# Patient Record
Sex: MALE | Race: WHITE | NOT HISPANIC OR LATINO | Employment: OTHER | URBAN - METROPOLITAN AREA
[De-identification: names, ages, dates, MRNs, and addresses within clinical notes are randomized per-mention and may not be internally consistent; named-entity substitution may affect disease eponyms.]

---

## 2019-11-26 ENCOUNTER — APPOINTMENT (OUTPATIENT)
Dept: GENERAL RADIOLOGY | Facility: HOSPITAL | Age: 79
End: 2019-11-26

## 2019-11-26 ENCOUNTER — HOSPITAL ENCOUNTER (OUTPATIENT)
Facility: HOSPITAL | Age: 79
Setting detail: OBSERVATION
Discharge: HOME | End: 2019-11-27
Attending: EMERGENCY MEDICINE | Admitting: INTERNAL MEDICINE

## 2019-11-26 DIAGNOSIS — Z86.79 HISTORY OF CORONARY ARTERY DISEASE: ICD-10-CM

## 2019-11-26 DIAGNOSIS — R07.9 CHEST PAIN, UNSPECIFIED TYPE: Primary | ICD-10-CM

## 2019-11-26 DIAGNOSIS — N28.9 RENAL INSUFFICIENCY: ICD-10-CM

## 2019-11-26 DIAGNOSIS — Z95.0 HISTORY OF PACEMAKER: ICD-10-CM

## 2019-11-26 DIAGNOSIS — Z86.79 HISTORY OF ATRIAL FIBRILLATION: ICD-10-CM

## 2019-11-26 LAB
ALBUMIN SERPL-MCNC: 4.3 G/DL (ref 3.5–5.2)
ALBUMIN/GLOB SERPL: 1.7 G/DL
ALP SERPL-CCNC: 72 U/L (ref 39–117)
ALT SERPL W P-5'-P-CCNC: 13 U/L (ref 1–41)
ANION GAP SERPL CALCULATED.3IONS-SCNC: 11 MMOL/L (ref 5–15)
AST SERPL-CCNC: 15 U/L (ref 1–40)
BASOPHILS # BLD AUTO: 0.07 10*3/MM3 (ref 0–0.2)
BASOPHILS NFR BLD AUTO: 0.8 % (ref 0–1.5)
BILIRUB SERPL-MCNC: 0.9 MG/DL (ref 0.2–1.2)
BUN BLD-MCNC: 14 MG/DL (ref 8–23)
BUN/CREAT SERPL: 8.7 (ref 7–25)
CALCIUM SPEC-SCNC: 10.6 MG/DL (ref 8.6–10.5)
CHLORIDE SERPL-SCNC: 105 MMOL/L (ref 98–107)
CO2 SERPL-SCNC: 26 MMOL/L (ref 22–29)
CREAT BLD-MCNC: 1.61 MG/DL (ref 0.76–1.27)
DEPRECATED RDW RBC AUTO: 43.7 FL (ref 37–54)
EOSINOPHIL # BLD AUTO: 0.47 10*3/MM3 (ref 0–0.4)
EOSINOPHIL NFR BLD AUTO: 5.6 % (ref 0.3–6.2)
ERYTHROCYTE [DISTWIDTH] IN BLOOD BY AUTOMATED COUNT: 13.3 % (ref 12.3–15.4)
GFR SERPL CREATININE-BSD FRML MDRD: 42 ML/MIN/1.73
GLOBULIN UR ELPH-MCNC: 2.6 GM/DL
GLUCOSE BLD-MCNC: 117 MG/DL (ref 65–99)
HCT VFR BLD AUTO: 41.8 % (ref 37.5–51)
HGB BLD-MCNC: 13.9 G/DL (ref 13–17.7)
HOLD SPECIMEN: NORMAL
HOLD SPECIMEN: NORMAL
IMM GRANULOCYTES # BLD AUTO: 0.02 10*3/MM3 (ref 0–0.05)
IMM GRANULOCYTES NFR BLD AUTO: 0.2 % (ref 0–0.5)
LIPASE SERPL-CCNC: 39 U/L (ref 13–60)
LYMPHOCYTES # BLD AUTO: 1.98 10*3/MM3 (ref 0.7–3.1)
LYMPHOCYTES NFR BLD AUTO: 23.7 % (ref 19.6–45.3)
MCH RBC QN AUTO: 30 PG (ref 26.6–33)
MCHC RBC AUTO-ENTMCNC: 33.3 G/DL (ref 31.5–35.7)
MCV RBC AUTO: 90.3 FL (ref 79–97)
MONOCYTES # BLD AUTO: 0.59 10*3/MM3 (ref 0.1–0.9)
MONOCYTES NFR BLD AUTO: 7 % (ref 5–12)
NEUTROPHILS # BLD AUTO: 5.24 10*3/MM3 (ref 1.7–7)
NEUTROPHILS NFR BLD AUTO: 62.7 % (ref 42.7–76)
NRBC BLD AUTO-RTO: 0 /100 WBC (ref 0–0.2)
NT-PROBNP SERPL-MCNC: 849.9 PG/ML (ref 5–1800)
PLATELET # BLD AUTO: 211 10*3/MM3 (ref 140–450)
PMV BLD AUTO: 10.8 FL (ref 6–12)
POTASSIUM BLD-SCNC: 4.4 MMOL/L (ref 3.5–5.2)
PROT SERPL-MCNC: 6.9 G/DL (ref 6–8.5)
RBC # BLD AUTO: 4.63 10*6/MM3 (ref 4.14–5.8)
SODIUM BLD-SCNC: 142 MMOL/L (ref 136–145)
TROPONIN T SERPL-MCNC: <0.01 NG/ML (ref 0–0.03)
WBC NRBC COR # BLD: 8.37 10*3/MM3 (ref 3.4–10.8)
WHOLE BLOOD HOLD SPECIMEN: NORMAL
WHOLE BLOOD HOLD SPECIMEN: NORMAL

## 2019-11-26 PROCEDURE — 80053 COMPREHEN METABOLIC PANEL: CPT

## 2019-11-26 PROCEDURE — G0378 HOSPITAL OBSERVATION PER HR: HCPCS

## 2019-11-26 PROCEDURE — 83880 ASSAY OF NATRIURETIC PEPTIDE: CPT

## 2019-11-26 PROCEDURE — 93005 ELECTROCARDIOGRAM TRACING: CPT

## 2019-11-26 PROCEDURE — 25010000002 HEPARIN (PORCINE) PER 1000 UNITS: Performed by: NURSE PRACTITIONER

## 2019-11-26 PROCEDURE — 93005 ELECTROCARDIOGRAM TRACING: CPT | Performed by: EMERGENCY MEDICINE

## 2019-11-26 PROCEDURE — 99219 PR INITIAL OBSERVATION CARE/DAY 50 MINUTES: CPT | Performed by: INTERNAL MEDICINE

## 2019-11-26 PROCEDURE — 71045 X-RAY EXAM CHEST 1 VIEW: CPT

## 2019-11-26 PROCEDURE — 83690 ASSAY OF LIPASE: CPT

## 2019-11-26 PROCEDURE — 84484 ASSAY OF TROPONIN QUANT: CPT

## 2019-11-26 PROCEDURE — 96372 THER/PROPH/DIAG INJ SC/IM: CPT

## 2019-11-26 PROCEDURE — 85025 COMPLETE CBC W/AUTO DIFF WBC: CPT | Performed by: EMERGENCY MEDICINE

## 2019-11-26 PROCEDURE — 99284 EMERGENCY DEPT VISIT MOD MDM: CPT

## 2019-11-26 RX ORDER — ATORVASTATIN CALCIUM 10 MG/1
10 TABLET, FILM COATED ORAL DAILY
Status: DISCONTINUED | OUTPATIENT
Start: 2019-11-26 | End: 2019-11-27 | Stop reason: HOSPADM

## 2019-11-26 RX ORDER — ASPIRIN 81 MG/1
324 TABLET, CHEWABLE ORAL ONCE
Status: COMPLETED | OUTPATIENT
Start: 2019-11-26 | End: 2019-11-26

## 2019-11-26 RX ORDER — ASPIRIN 81 MG/1
81 TABLET, CHEWABLE ORAL DAILY
Status: DISCONTINUED | OUTPATIENT
Start: 2019-11-27 | End: 2019-11-27 | Stop reason: HOSPADM

## 2019-11-26 RX ORDER — SIMVASTATIN 20 MG
20 TABLET ORAL NIGHTLY
COMMUNITY

## 2019-11-26 RX ORDER — SODIUM CHLORIDE 0.9 % (FLUSH) 0.9 %
10 SYRINGE (ML) INJECTION AS NEEDED
Status: DISCONTINUED | OUTPATIENT
Start: 2019-11-26 | End: 2019-11-27 | Stop reason: HOSPADM

## 2019-11-26 RX ORDER — ASPIRIN 81 MG/1
81 TABLET ORAL DAILY
COMMUNITY

## 2019-11-26 RX ORDER — ACETAMINOPHEN 650 MG/1
650 SUPPOSITORY RECTAL EVERY 4 HOURS PRN
Status: DISCONTINUED | OUTPATIENT
Start: 2019-11-26 | End: 2019-11-27 | Stop reason: HOSPADM

## 2019-11-26 RX ORDER — SODIUM CHLORIDE 0.9 % (FLUSH) 0.9 %
10 SYRINGE (ML) INJECTION EVERY 12 HOURS SCHEDULED
Status: DISCONTINUED | OUTPATIENT
Start: 2019-11-26 | End: 2019-11-27 | Stop reason: HOSPADM

## 2019-11-26 RX ORDER — SOTALOL HYDROCHLORIDE 120 MG/1
120 TABLET ORAL 2 TIMES DAILY
Status: ON HOLD | COMMUNITY
End: 2019-11-27 | Stop reason: SDUPTHER

## 2019-11-26 RX ORDER — AMLODIPINE BESYLATE 5 MG/1
5 TABLET ORAL DAILY
Status: ON HOLD | COMMUNITY
End: 2019-11-27 | Stop reason: SDUPTHER

## 2019-11-26 RX ORDER — ACETAMINOPHEN 160 MG/5ML
650 SOLUTION ORAL EVERY 4 HOURS PRN
Status: DISCONTINUED | OUTPATIENT
Start: 2019-11-26 | End: 2019-11-27 | Stop reason: HOSPADM

## 2019-11-26 RX ORDER — CLOPIDOGREL BISULFATE 75 MG/1
75 TABLET ORAL DAILY
COMMUNITY
End: 2020-03-09 | Stop reason: SDUPTHER

## 2019-11-26 RX ORDER — DOCUSATE SODIUM 100 MG/1
100 CAPSULE, LIQUID FILLED ORAL 2 TIMES DAILY
Status: DISCONTINUED | OUTPATIENT
Start: 2019-11-26 | End: 2019-11-27 | Stop reason: HOSPADM

## 2019-11-26 RX ORDER — CLOPIDOGREL BISULFATE 75 MG/1
75 TABLET ORAL DAILY
Status: DISCONTINUED | OUTPATIENT
Start: 2019-11-26 | End: 2019-11-27 | Stop reason: HOSPADM

## 2019-11-26 RX ORDER — HEPARIN SODIUM 5000 [USP'U]/ML
5000 INJECTION, SOLUTION INTRAVENOUS; SUBCUTANEOUS EVERY 12 HOURS SCHEDULED
Status: DISCONTINUED | OUTPATIENT
Start: 2019-11-26 | End: 2019-11-27 | Stop reason: HOSPADM

## 2019-11-26 RX ORDER — AMLODIPINE BESYLATE 5 MG/1
5 TABLET ORAL DAILY
Status: DISCONTINUED | OUTPATIENT
Start: 2019-11-26 | End: 2019-11-27 | Stop reason: HOSPADM

## 2019-11-26 RX ORDER — SOTALOL HYDROCHLORIDE 80 MG/1
80 TABLET ORAL
Status: DISCONTINUED | OUTPATIENT
Start: 2019-11-26 | End: 2019-11-27 | Stop reason: HOSPADM

## 2019-11-26 RX ORDER — SODIUM CHLORIDE 9 MG/ML
75 INJECTION, SOLUTION INTRAVENOUS CONTINUOUS
Status: DISCONTINUED | OUTPATIENT
Start: 2019-11-26 | End: 2019-11-27 | Stop reason: HOSPADM

## 2019-11-26 RX ORDER — SOTALOL HYDROCHLORIDE 80 MG/1
120 TABLET ORAL
Status: DISCONTINUED | OUTPATIENT
Start: 2019-11-27 | End: 2019-11-26 | Stop reason: SDUPTHER

## 2019-11-26 RX ORDER — ACETAMINOPHEN 325 MG/1
650 TABLET ORAL EVERY 4 HOURS PRN
Status: DISCONTINUED | OUTPATIENT
Start: 2019-11-26 | End: 2019-11-27 | Stop reason: HOSPADM

## 2019-11-26 RX ADMIN — SODIUM CHLORIDE, POTASSIUM CHLORIDE, SODIUM LACTATE AND CALCIUM CHLORIDE 500 ML: 600; 310; 30; 20 INJECTION, SOLUTION INTRAVENOUS at 15:35

## 2019-11-26 RX ADMIN — DOCUSATE SODIUM 100 MG: 100 CAPSULE, LIQUID FILLED ORAL at 22:14

## 2019-11-26 RX ADMIN — HEPARIN SODIUM 5000 UNITS: 5000 INJECTION, SOLUTION INTRAVENOUS; SUBCUTANEOUS at 22:14

## 2019-11-26 RX ADMIN — SODIUM CHLORIDE, PRESERVATIVE FREE 10 ML: 5 INJECTION INTRAVENOUS at 22:15

## 2019-11-26 RX ADMIN — SODIUM CHLORIDE 75 ML/HR: 9 INJECTION, SOLUTION INTRAVENOUS at 22:26

## 2019-11-26 RX ADMIN — ATORVASTATIN CALCIUM 10 MG: 10 TABLET, FILM COATED ORAL at 22:14

## 2019-11-26 RX ADMIN — ASPIRIN 81 MG 324 MG: 81 TABLET ORAL at 15:24

## 2019-11-27 ENCOUNTER — APPOINTMENT (OUTPATIENT)
Dept: CARDIOLOGY | Facility: HOSPITAL | Age: 79
End: 2019-11-27

## 2019-11-27 VITALS
BODY MASS INDEX: 30.27 KG/M2 | OXYGEN SATURATION: 93 % | SYSTOLIC BLOOD PRESSURE: 149 MMHG | TEMPERATURE: 97.4 F | RESPIRATION RATE: 16 BRPM | DIASTOLIC BLOOD PRESSURE: 92 MMHG | WEIGHT: 235.89 LBS | HEART RATE: 62 BPM | HEIGHT: 74 IN

## 2019-11-27 PROBLEM — I10 ESSENTIAL HYPERTENSION: Status: ACTIVE | Noted: 2019-11-27

## 2019-11-27 PROBLEM — I49.5 SICK SINUS SYNDROME (HCC): Status: ACTIVE | Noted: 2019-11-27

## 2019-11-27 PROBLEM — I48.0 PAROXYSMAL ATRIAL FIBRILLATION (HCC): Status: ACTIVE | Noted: 2019-11-27

## 2019-11-27 PROBLEM — I25.118 CORONARY ARTERY DISEASE OF NATIVE ARTERY OF NATIVE HEART WITH STABLE ANGINA PECTORIS (HCC): Status: ACTIVE | Noted: 2019-11-27

## 2019-11-27 PROBLEM — R07.9 CHEST PAIN: Status: ACTIVE | Noted: 2019-11-27

## 2019-11-27 PROBLEM — E78.2 MIXED HYPERLIPIDEMIA: Status: ACTIVE | Noted: 2019-11-27

## 2019-11-27 LAB
ANION GAP SERPL CALCULATED.3IONS-SCNC: 13 MMOL/L (ref 5–15)
BH CV ECHO MEAS - AO MAX PG (FULL): 2.1 MMHG
BH CV ECHO MEAS - AO MAX PG: 7.5 MMHG
BH CV ECHO MEAS - AO MEAN PG (FULL): 1 MMHG
BH CV ECHO MEAS - AO MEAN PG: 3.9 MMHG
BH CV ECHO MEAS - AO ROOT AREA (BSA CORRECTED): 1.5
BH CV ECHO MEAS - AO ROOT AREA: 9.5 CM^2
BH CV ECHO MEAS - AO ROOT DIAM: 3.5 CM
BH CV ECHO MEAS - AO V2 MAX: 137 CM/SEC
BH CV ECHO MEAS - AO V2 MEAN: 92.7 CM/SEC
BH CV ECHO MEAS - AO V2 VTI: 34.9 CM
BH CV ECHO MEAS - AVA(I,A): 4 CM^2
BH CV ECHO MEAS - AVA(I,D): 4 CM^2
BH CV ECHO MEAS - AVA(V,A): 4.3 CM^2
BH CV ECHO MEAS - AVA(V,D): 4.3 CM^2
BH CV ECHO MEAS - BSA(HAYCOCK): 2.4 M^2
BH CV ECHO MEAS - BSA: 2.3 M^2
BH CV ECHO MEAS - BZI_BMI: 30.2 KILOGRAMS/M^2
BH CV ECHO MEAS - BZI_METRIC_HEIGHT: 188 CM
BH CV ECHO MEAS - BZI_METRIC_WEIGHT: 106.6 KG
BH CV ECHO MEAS - EDV(CUBED): 100.2 ML
BH CV ECHO MEAS - EDV(MOD-SP2): 92 ML
BH CV ECHO MEAS - EDV(MOD-SP4): 90 ML
BH CV ECHO MEAS - EDV(TEICH): 99.6 ML
BH CV ECHO MEAS - EF(CUBED): 70.6 %
BH CV ECHO MEAS - EF(MOD-BP): 69 %
BH CV ECHO MEAS - EF(MOD-SP2): 57.6 %
BH CV ECHO MEAS - EF(MOD-SP4): 76.7 %
BH CV ECHO MEAS - EF(TEICH): 62.2 %
BH CV ECHO MEAS - ESV(CUBED): 29.5 ML
BH CV ECHO MEAS - ESV(MOD-SP2): 39 ML
BH CV ECHO MEAS - ESV(MOD-SP4): 21 ML
BH CV ECHO MEAS - ESV(TEICH): 37.6 ML
BH CV ECHO MEAS - FS: 33.5 %
BH CV ECHO MEAS - IVS/LVPW: 0.85
BH CV ECHO MEAS - IVSD: 0.79 CM
BH CV ECHO MEAS - LA DIMENSION: 3.6 CM
BH CV ECHO MEAS - LA/AO: 1
BH CV ECHO MEAS - LAD MAJOR: 4.9 CM
BH CV ECHO MEAS - LAT PEAK E' VEL: 7.9 CM/SEC
BH CV ECHO MEAS - LATERAL E/E' RATIO: 8.5
BH CV ECHO MEAS - LV DIASTOLIC VOL/BSA (35-75): 38.7 ML/M^2
BH CV ECHO MEAS - LV MASS(C)D: 131.6 GRAMS
BH CV ECHO MEAS - LV MASS(C)DI: 56.6 GRAMS/M^2
BH CV ECHO MEAS - LV MAX PG: 5.4 MMHG
BH CV ECHO MEAS - LV MEAN PG: 2.9 MMHG
BH CV ECHO MEAS - LV SYSTOLIC VOL/BSA (12-30): 9 ML/M^2
BH CV ECHO MEAS - LV V1 MAX: 116.3 CM/SEC
BH CV ECHO MEAS - LV V1 MEAN: 78.7 CM/SEC
BH CV ECHO MEAS - LV V1 VTI: 27.4 CM
BH CV ECHO MEAS - LVIDD: 4.6 CM
BH CV ECHO MEAS - LVIDS: 3.1 CM
BH CV ECHO MEAS - LVLD AP2: 7.6 CM
BH CV ECHO MEAS - LVLD AP4: 7.4 CM
BH CV ECHO MEAS - LVLS AP2: 6.5 CM
BH CV ECHO MEAS - LVLS AP4: 6.3 CM
BH CV ECHO MEAS - LVOT AREA (M): 5.3 CM^2
BH CV ECHO MEAS - LVOT AREA: 5.1 CM^2
BH CV ECHO MEAS - LVOT DIAM: 2.5 CM
BH CV ECHO MEAS - LVPWD: 0.93 CM
BH CV ECHO MEAS - MED PEAK E' VEL: 5.9 CM/SEC
BH CV ECHO MEAS - MEDIAL E/E' RATIO: 11.2
BH CV ECHO MEAS - MV A MAX VEL: 89.9 CM/SEC
BH CV ECHO MEAS - MV DEC TIME: 0.26 SEC
BH CV ECHO MEAS - MV E MAX VEL: 68 CM/SEC
BH CV ECHO MEAS - MV E/A: 0.76
BH CV ECHO MEAS - PA ACC SLOPE: 331.4 CM/SEC^2
BH CV ECHO MEAS - PA ACC TIME: 0.18 SEC
BH CV ECHO MEAS - PA MAX PG: 2.1 MMHG
BH CV ECHO MEAS - PA PR(ACCEL): -3.4 MMHG
BH CV ECHO MEAS - PA V2 MAX: 72.4 CM/SEC
BH CV ECHO MEAS - SI(AO): 142.3 ML/M^2
BH CV ECHO MEAS - SI(CUBED): 30.4 ML/M^2
BH CV ECHO MEAS - SI(LVOT): 60.2 ML/M^2
BH CV ECHO MEAS - SI(MOD-SP2): 22.8 ML/M^2
BH CV ECHO MEAS - SI(MOD-SP4): 29.6 ML/M^2
BH CV ECHO MEAS - SI(TEICH): 26.6 ML/M^2
BH CV ECHO MEAS - SV(AO): 331.2 ML
BH CV ECHO MEAS - SV(CUBED): 70.7 ML
BH CV ECHO MEAS - SV(LVOT): 140.1 ML
BH CV ECHO MEAS - SV(MOD-SP2): 53 ML
BH CV ECHO MEAS - SV(MOD-SP4): 69 ML
BH CV ECHO MEAS - SV(TEICH): 62 ML
BH CV ECHO MEAS - TAPSE (>1.6): 2.8 CM2
BH CV ECHO MEASUREMENTS AVERAGE E/E' RATIO: 9.86
BH CV STRESS BP STAGE 1: NORMAL
BH CV STRESS BP STAGE 4: NORMAL
BH CV STRESS COMMENTS STAGE 1: NORMAL
BH CV STRESS DOSE REGADENOSON STAGE 1: 0.4
BH CV STRESS DURATION MIN STAGE 1: 1
BH CV STRESS DURATION MIN STAGE 2: 1
BH CV STRESS DURATION MIN STAGE 3: 1
BH CV STRESS DURATION MIN STAGE 4: 1
BH CV STRESS DURATION SEC STAGE 1: 0
BH CV STRESS DURATION SEC STAGE 2: 0
BH CV STRESS DURATION SEC STAGE 3: 0
BH CV STRESS DURATION SEC STAGE 4: 0
BH CV STRESS HR STAGE 1: 60
BH CV STRESS HR STAGE 2: 73
BH CV STRESS HR STAGE 3: 68
BH CV STRESS HR STAGE 4: 69
BH CV STRESS O2 STAGE 1: 97
BH CV STRESS O2 STAGE 2: 97
BH CV STRESS O2 STAGE 3: 97
BH CV STRESS O2 STAGE 4: 96
BH CV STRESS PROTOCOL 1: NORMAL
BH CV STRESS RECOVERY BP: NORMAL MMHG
BH CV STRESS RECOVERY HR: 67 BPM
BH CV STRESS RECOVERY O2: 96 %
BH CV STRESS STAGE 1: 1
BH CV STRESS STAGE 2: 2
BH CV STRESS STAGE 3: 3
BH CV STRESS STAGE 4: 4
BH CV VAS BP RIGHT ARM: NORMAL MMHG
BH CV XLRA - RV BASE: 3.7 CM
BH CV XLRA - RV LENGTH: 8 CM
BH CV XLRA - RV MID: 2.8 CM
BH CV XLRA - TDI S': 12.5 CM/SEC
BUN BLD-MCNC: 14 MG/DL (ref 8–23)
BUN/CREAT SERPL: 9.7 (ref 7–25)
CALCIUM SPEC-SCNC: 10.4 MG/DL (ref 8.6–10.5)
CHLORIDE SERPL-SCNC: 103 MMOL/L (ref 98–107)
CHOLEST SERPL-MCNC: 139 MG/DL (ref 0–200)
CO2 SERPL-SCNC: 25 MMOL/L (ref 22–29)
CREAT BLD-MCNC: 1.44 MG/DL (ref 0.76–1.27)
DEPRECATED RDW RBC AUTO: 43.2 FL (ref 37–54)
ERYTHROCYTE [DISTWIDTH] IN BLOOD BY AUTOMATED COUNT: 13.2 % (ref 12.3–15.4)
GFR SERPL CREATININE-BSD FRML MDRD: 47 ML/MIN/1.73
GLUCOSE BLD-MCNC: 111 MG/DL (ref 65–99)
HBA1C MFR BLD: 6.5 % (ref 4.8–5.6)
HCT VFR BLD AUTO: 40.5 % (ref 37.5–51)
HDLC SERPL-MCNC: 40 MG/DL (ref 40–60)
HGB BLD-MCNC: 13.7 G/DL (ref 13–17.7)
LDLC SERPL CALC-MCNC: 77 MG/DL (ref 0–100)
LDLC/HDLC SERPL: 1.92 {RATIO}
LEFT ATRIUM VOLUME INDEX: 21.9 ML/M^2
LEFT ATRIUM VOLUME: 51 ML
LV EF 2D ECHO EST: 60 %
LV EF NUC BP: 62 %
MAXIMAL PREDICTED HEART RATE: 141 BPM
MAXIMAL PREDICTED HEART RATE: 141 BPM
MCH RBC QN AUTO: 30.3 PG (ref 26.6–33)
MCHC RBC AUTO-ENTMCNC: 33.8 G/DL (ref 31.5–35.7)
MCV RBC AUTO: 89.6 FL (ref 79–97)
PERCENT MAX PREDICTED HR: 51.77 %
PLATELET # BLD AUTO: 189 10*3/MM3 (ref 140–450)
PMV BLD AUTO: 10.7 FL (ref 6–12)
POTASSIUM BLD-SCNC: 4.1 MMOL/L (ref 3.5–5.2)
RBC # BLD AUTO: 4.52 10*6/MM3 (ref 4.14–5.8)
SODIUM BLD-SCNC: 141 MMOL/L (ref 136–145)
STRESS BASELINE BP: NORMAL MMHG
STRESS BASELINE HR: 60 BPM
STRESS O2 SAT REST: 95 %
STRESS PERCENT HR: 61 %
STRESS POST ESTIMATED WORKLOAD: 1 METS
STRESS POST EXERCISE DUR MIN: 4 MIN
STRESS POST EXERCISE DUR SEC: 0 SEC
STRESS POST O2 SAT PEAK: 97 %
STRESS POST PEAK BP: NORMAL MMHG
STRESS POST PEAK HR: 73 BPM
STRESS TARGET HR: 120 BPM
STRESS TARGET HR: 120 BPM
TRIGL SERPL-MCNC: 112 MG/DL (ref 0–150)
TROPONIN T SERPL-MCNC: <0.01 NG/ML (ref 0–0.03)
VLDLC SERPL-MCNC: 22.4 MG/DL
WBC NRBC COR # BLD: 8.12 10*3/MM3 (ref 3.4–10.8)

## 2019-11-27 PROCEDURE — 25010000002 HEPARIN (PORCINE) PER 1000 UNITS: Performed by: NURSE PRACTITIONER

## 2019-11-27 PROCEDURE — 80048 BASIC METABOLIC PNL TOTAL CA: CPT | Performed by: NURSE PRACTITIONER

## 2019-11-27 PROCEDURE — 83036 HEMOGLOBIN GLYCOSYLATED A1C: CPT | Performed by: NURSE PRACTITIONER

## 2019-11-27 PROCEDURE — 0 RUBIDIUM CHLORIDE: Performed by: NURSE PRACTITIONER

## 2019-11-27 PROCEDURE — 78492 MYOCRD IMG PET MLT RST&STRS: CPT | Performed by: INTERNAL MEDICINE

## 2019-11-27 PROCEDURE — 96372 THER/PROPH/DIAG INJ SC/IM: CPT

## 2019-11-27 PROCEDURE — 93018 CV STRESS TEST I&R ONLY: CPT | Performed by: INTERNAL MEDICINE

## 2019-11-27 PROCEDURE — G0378 HOSPITAL OBSERVATION PER HR: HCPCS

## 2019-11-27 PROCEDURE — 25010000002 LORAZEPAM PER 2 MG: Performed by: INTERNAL MEDICINE

## 2019-11-27 PROCEDURE — 96376 TX/PRO/DX INJ SAME DRUG ADON: CPT

## 2019-11-27 PROCEDURE — 99217 PR OBSERVATION CARE DISCHARGE MANAGEMENT: CPT | Performed by: INTERNAL MEDICINE

## 2019-11-27 PROCEDURE — 25010000002 REGADENOSON 0.4 MG/5ML SOLUTION: Performed by: NURSE PRACTITIONER

## 2019-11-27 PROCEDURE — 85027 COMPLETE CBC AUTOMATED: CPT | Performed by: NURSE PRACTITIONER

## 2019-11-27 PROCEDURE — 93306 TTE W/DOPPLER COMPLETE: CPT

## 2019-11-27 PROCEDURE — 93010 ELECTROCARDIOGRAM REPORT: CPT | Performed by: INTERNAL MEDICINE

## 2019-11-27 PROCEDURE — 93306 TTE W/DOPPLER COMPLETE: CPT | Performed by: INTERNAL MEDICINE

## 2019-11-27 PROCEDURE — 80061 LIPID PANEL: CPT | Performed by: NURSE PRACTITIONER

## 2019-11-27 PROCEDURE — 96374 THER/PROPH/DIAG INJ IV PUSH: CPT

## 2019-11-27 PROCEDURE — 84484 ASSAY OF TROPONIN QUANT: CPT | Performed by: NURSE PRACTITIONER

## 2019-11-27 PROCEDURE — 93017 CV STRESS TEST TRACING ONLY: CPT

## 2019-11-27 PROCEDURE — 78492 MYOCRD IMG PET MLT RST&STRS: CPT

## 2019-11-27 PROCEDURE — 93005 ELECTROCARDIOGRAM TRACING: CPT | Performed by: NURSE PRACTITIONER

## 2019-11-27 PROCEDURE — A9555 RB82 RUBIDIUM: HCPCS | Performed by: NURSE PRACTITIONER

## 2019-11-27 PROCEDURE — 93280 PM DEVICE PROGR EVAL DUAL: CPT | Performed by: INTERNAL MEDICINE

## 2019-11-27 RX ORDER — LORAZEPAM 2 MG/ML
0.5 INJECTION INTRAMUSCULAR ONCE
Status: COMPLETED | OUTPATIENT
Start: 2019-11-27 | End: 2019-11-27

## 2019-11-27 RX ORDER — ISOSORBIDE MONONITRATE 30 MG/1
30 TABLET, EXTENDED RELEASE ORAL EVERY MORNING
Qty: 30 TABLET | Refills: 2 | Status: SHIPPED | OUTPATIENT
Start: 2019-11-27 | End: 2019-11-29 | Stop reason: SDUPTHER

## 2019-11-27 RX ORDER — SOTALOL HYDROCHLORIDE 120 MG/1
120 TABLET ORAL DAILY
Qty: 30 TABLET | Refills: 2 | Status: SHIPPED | OUTPATIENT
Start: 2019-11-27 | End: 2019-11-29 | Stop reason: SDUPTHER

## 2019-11-27 RX ORDER — AMLODIPINE BESYLATE 10 MG/1
10 TABLET ORAL DAILY
Qty: 30 TABLET | Refills: 2 | Status: SHIPPED | OUTPATIENT
Start: 2019-11-27 | End: 2019-11-29 | Stop reason: SDUPTHER

## 2019-11-27 RX ADMIN — REGADENOSON 0.4 MG: 0.08 INJECTION, SOLUTION INTRAVENOUS at 09:09

## 2019-11-27 RX ADMIN — HEPARIN SODIUM 5000 UNITS: 5000 INJECTION, SOLUTION INTRAVENOUS; SUBCUTANEOUS at 09:40

## 2019-11-27 RX ADMIN — RUBIDIUM CHLORIDE RB-82 1 DOSE: 150 INJECTION, SOLUTION INTRAVENOUS at 09:10

## 2019-11-27 RX ADMIN — ASPIRIN 81 MG 81 MG: 81 TABLET ORAL at 09:39

## 2019-11-27 RX ADMIN — DOCUSATE SODIUM 100 MG: 100 CAPSULE, LIQUID FILLED ORAL at 09:39

## 2019-11-27 RX ADMIN — AMLODIPINE BESYLATE 5 MG: 5 TABLET ORAL at 09:39

## 2019-11-27 RX ADMIN — LORAZEPAM 0.5 MG: 2 INJECTION INTRAMUSCULAR; INTRAVENOUS at 08:55

## 2019-11-27 RX ADMIN — CLOPIDOGREL BISULFATE 75 MG: 75 TABLET ORAL at 09:39

## 2019-11-27 RX ADMIN — SOTALOL HYDROCHLORIDE 80 MG: 80 TABLET ORAL at 09:39

## 2019-11-27 RX ADMIN — ATORVASTATIN CALCIUM 10 MG: 10 TABLET, FILM COATED ORAL at 09:39

## 2019-11-27 RX ADMIN — LORAZEPAM 0.5 MG: 2 INJECTION INTRAMUSCULAR; INTRAVENOUS at 08:45

## 2019-11-27 RX ADMIN — RUBIDIUM CHLORIDE RB-82 1 DOSE: 150 INJECTION, SOLUTION INTRAVENOUS at 08:55

## 2019-11-29 RX ORDER — SOTALOL HYDROCHLORIDE 120 MG/1
120 TABLET ORAL DAILY
Qty: 30 TABLET | Refills: 2 | Status: SHIPPED | OUTPATIENT
Start: 2019-11-29

## 2019-11-29 RX ORDER — ISOSORBIDE MONONITRATE 30 MG/1
30 TABLET, EXTENDED RELEASE ORAL EVERY MORNING
Qty: 30 TABLET | Refills: 1 | Status: SHIPPED | OUTPATIENT
Start: 2019-11-29

## 2019-11-29 RX ORDER — AMLODIPINE BESYLATE 10 MG/1
10 TABLET ORAL DAILY
Qty: 30 TABLET | Refills: 1 | Status: SHIPPED | OUTPATIENT
Start: 2019-11-29

## 2019-12-02 ENCOUNTER — DOCUMENTATION (OUTPATIENT)
Dept: CARDIOLOGY | Facility: CLINIC | Age: 79
End: 2019-12-02

## 2019-12-02 NOTE — PROGRESS NOTES
Cardiology Note    Additional outside hospital cardiac records, [facility]  1. Unstable angina 3/2010 status post 3.5 x 12 mm BMS to a 95% LAD stenosis  2. NSTEMI 11/2010 status post LAD ISR with new MOISES Xience placed  3. NSTEMI in 3/20/2012 and 10/20/2013 without new culprits or new stenting  4. Repeat heart catheter 3/2015 with FFR performed in the LAD and circumflex arteries.  Lesions were not found to be functioning significant  5. Previous intolerance to multiple anticoagulants due to bleeding issues including Xarelto, Pradaxa, Eliquis

## 2020-01-20 ENCOUNTER — OFFICE VISIT (OUTPATIENT)
Dept: CARDIOLOGY | Facility: CLINIC | Age: 80
End: 2020-01-20

## 2020-01-20 ENCOUNTER — TELEPHONE (OUTPATIENT)
Dept: CARDIOLOGY | Facility: CLINIC | Age: 80
End: 2020-01-20

## 2020-01-20 ENCOUNTER — LAB (OUTPATIENT)
Dept: LAB | Facility: HOSPITAL | Age: 80
End: 2020-01-20

## 2020-01-20 VITALS
HEIGHT: 74 IN | WEIGHT: 238 LBS | DIASTOLIC BLOOD PRESSURE: 88 MMHG | SYSTOLIC BLOOD PRESSURE: 126 MMHG | BODY MASS INDEX: 30.54 KG/M2 | HEART RATE: 60 BPM

## 2020-01-20 DIAGNOSIS — E78.2 MIXED HYPERLIPIDEMIA: ICD-10-CM

## 2020-01-20 DIAGNOSIS — I49.5 SICK SINUS SYNDROME (HCC): ICD-10-CM

## 2020-01-20 DIAGNOSIS — I25.118 CORONARY ARTERY DISEASE OF NATIVE ARTERY OF NATIVE HEART WITH STABLE ANGINA PECTORIS (HCC): Primary | ICD-10-CM

## 2020-01-20 DIAGNOSIS — I48.0 PAROXYSMAL ATRIAL FIBRILLATION (HCC): ICD-10-CM

## 2020-01-20 DIAGNOSIS — I10 ESSENTIAL HYPERTENSION: ICD-10-CM

## 2020-01-20 DIAGNOSIS — I25.118 CORONARY ARTERY DISEASE OF NATIVE ARTERY OF NATIVE HEART WITH STABLE ANGINA PECTORIS (HCC): ICD-10-CM

## 2020-01-20 LAB
ANION GAP SERPL CALCULATED.3IONS-SCNC: 12.7 MMOL/L (ref 5–15)
BUN BLD-MCNC: 17 MG/DL (ref 8–23)
BUN/CREAT SERPL: 11.2 (ref 7–25)
CALCIUM SPEC-SCNC: 10.5 MG/DL (ref 8.6–10.5)
CHLORIDE SERPL-SCNC: 100 MMOL/L (ref 98–107)
CO2 SERPL-SCNC: 27.3 MMOL/L (ref 22–29)
CREAT BLD-MCNC: 1.52 MG/DL (ref 0.76–1.27)
GFR SERPL CREATININE-BSD FRML MDRD: 44 ML/MIN/1.73
GLUCOSE BLD-MCNC: 171 MG/DL (ref 65–99)
POTASSIUM BLD-SCNC: 4.4 MMOL/L (ref 3.5–5.2)
SODIUM BLD-SCNC: 140 MMOL/L (ref 136–145)

## 2020-01-20 PROCEDURE — 93280 PM DEVICE PROGR EVAL DUAL: CPT | Performed by: INTERNAL MEDICINE

## 2020-01-20 PROCEDURE — 36415 COLL VENOUS BLD VENIPUNCTURE: CPT

## 2020-01-20 PROCEDURE — 80048 BASIC METABOLIC PNL TOTAL CA: CPT

## 2020-01-20 PROCEDURE — 99214 OFFICE O/P EST MOD 30 MIN: CPT | Performed by: INTERNAL MEDICINE

## 2020-01-20 NOTE — PROGRESS NOTES
[facility] AT [address], [address]  [place], [place], [address] [phone]  WWW.[facility] HospitalConversion SoundTwo [facility]           OUTPATIENT CLINIC FOLLOW UP NOTE    Patient Care Team:  Patient Care Team:  Gee Rosen MD as PCP - General (Family Medicine)    Subjective:      Chief Complaint   Patient presents with   • Atrial Fibrillation   • Coronary Artery Disease   • Hypertension   • SSS       HPI:    Fabian Chaparro is a 79 y.o. male.    Cardiac problem list:  1. CAD   1. 3.5 x 12 mm BMS to the LAD in 3/2010  2. NSTEMI 2010 s/p LAD ISR s/p MOISES  3. NSTEMI 3/2012 and 10/2013 [facility] without new culprits for PCI  4.  [facility] with patent stent and a proximal LAD lesion as well as a mid circumflex lesion that were not found to be significant by FFR.  [facility]  5. 2019 atypical chest pain syndrome with a stress test negative for ischemia  2. Paroxysmal atrial fibrillation, CHADSVASc of 4, intolerant to anticoagulants due to bleeding (details of bleeding not known) including Xarelto, Pradaxa, Eliquis, Savaysa. Reportedly, sister was to get a Watchman, but  of a stroke prior to work up.  3. Symptomatic bradycardia status post pacemaker placement  4. Remote nicotine dependence  5. CKD 3 at least as of   6. 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 Orthostatic symptoms   8. Essential hypertension  9. Mixed hyperlipidemia    Since meeting him at his hospitalization in November, the patient has had less chest pains.  Occasionally has fleeting chest pains at rest lasting seconds that are sharp in nature that resolve spontaneously.  These are not associated with exertional activities.  He also states he wakes up at 3 AM most mornings for 20 to 25 minutes with palpitations.  No clear exacerbating or relieving factors.  Tolerating Imdur    Review of Systems:  Positive for palpitations, atypical fleeting chest pains not assoc with exertion  Negative  "for exertional chest pain, dyspnea with exertion, orthopnea, PND, lower extremity edema, lightheadedness, syncope.     PFSH:  Patient Active Problem List   Diagnosis   • Coronary artery disease of native artery of native heart with stable angina pectoris (CMS/HCC)   • Paroxysmal atrial fibrillation (CMS/HCC)   • Sick sinus syndrome (CMS/HCC)   • Essential hypertension   • Mixed hyperlipidemia   • Chest pain         Current Outpatient Medications:   •  amLODIPine (NORVASC) 10 MG tablet, Take 1 tablet by mouth Daily., Disp: 30 tablet, Rfl: 1  •  aspirin 81 MG chewable tablet, Chew 81 mg Daily., Disp: , Rfl:   •  clopidogrel (PLAVIX) 75 MG tablet, Take 75 mg by mouth Daily., Disp: , Rfl:   •  isosorbide mononitrate (IMDUR) 30 MG 24 hr tablet, Take 1 tablet by mouth Every Morning., Disp: 30 tablet, Rfl: 1  •  simvastatin (ZOCOR) 20 MG tablet, Take 20 mg by mouth Every Night., Disp: , Rfl:   •  sotalol (BETAPACE) 120 MG tablet, Take 1 tablet by mouth Daily., Disp: 30 tablet, Rfl: 2    No Known Allergies     reports that he quit smoking about 20 years ago. His smoking use included cigarettes. He has never used smokeless tobacco.    Family History   Problem Relation Age of Onset   • Atrial fibrillation Father    • Atrial fibrillation Sister    • Atrial fibrillation Brother          Objective:   Physical exam:  /88 (BP Location: Left arm, Patient Position: Sitting)   Pulse 60   Ht 188 cm (74\")   Wt 108 kg (238 lb)   BMI 30.56 kg/m²   CONSTITUTIONAL: No acute distress  RESPIRATORY: Normal effort. Clear to auscultation bilaterally without wheezing or rales  CARDIOVASCULAR: Carotids with normal upstrokes without bruits.  Regular rate and rhythm with normal S1 and S2. Without murmur, gallop or rub. Normal radial pulse.   PSYCH: Normal affect    Labs:    BUN   Date Value Ref Range Status   11/27/2019 14 8 - 23 mg/dL Final     Creatinine   Date Value Ref Range Status   11/27/2019 1.44 (H) 0.76 - 1.27 mg/dL Final "     Potassium   Date Value Ref Range Status   11/27/2019 4.1 3.5 - 5.2 mmol/L Final     ALT (SGPT)   Date Value Ref Range Status   11/26/2019 13 1 - 41 U/L Final     AST (SGOT)   Date Value Ref Range Status   11/26/2019 15 1 - 40 U/L Final       Lab Results   Component Value Date    CHOL 139 11/27/2019     Lab Results   Component Value Date    TRIG 112 11/27/2019     Lab Results   Component Value Date    HDL 40 11/27/2019     Lab Results   Component Value Date    LDL 77 11/27/2019     No components found for: LDLDIRECTC    Diagnostic Data:      ECG 12 Lead  Date/Time: 1/20/2020 9:25 AM  Performed by: Ej Livingston MD  Authorized by: Ej Livingston MD   Comparison: compared with previous ECG from 11/27/2019  Similar to previous ECG  Comments: A paced with prolonged AV conduction, left axis deviation, T wave abnormality in the anterolateral segments, , QTc 430, heart rate 60          [place]  [facility]   RA pacing 91%, RV pacing <1%. P wave is 8.1 mV with a threshold of 0.9 V at 0.4 msec and an impedance of 839 ohms. R wave is 10.9 mV with a threshold of 1.1 V at 0.4 msec and an impedance of 686 ohms. Battery voltage is 6.5yrs.  No A. fib noted, no significant ectopy burden.  Appears mild.    [place]  [facility] [place]27[place]19-   RA pacing 87%, RV pacing 97%. P wave is 6.6 mV with a threshold of 0.8 V at 0.4 msec and an impedance of 750 ohms. R wave is 9.7 mV with a threshold of 1.2 V at 0.4 msec and an impedance of 656 ohms. Battery voltage is 6.5yr. Since 11/22/19, 108 PACs, 26PVCs, 0% AFib    Stress test 11/2019  · Left ventricular ejection fraction is normal (Calculated EF = 62%).  · Myocardial perfusion imaging indicates a normal myocardial perfusion study with no evidence of ischemia.  · Impressions are consistent with a low risk study.    Echo 11/2019  · Left ventricular systolic function is normal. Estimated EF = 60%.  · Left ventricular  diastolic dysfunction (grade I a) consistent with impaired relaxation.       Assessment and Plan:   Fabian was seen today for atrial fibrillation, coronary artery disease, hypertension and sss.    Diagnoses and all orders for this visit:    Coronary artery disease of native artery of native heart with stable angina pectoris (CMS/HCC)  Mixed hyperlipidemia  -Continue current related medications  -Continue current medications.  -Would stop Plavix when starting an anticoagulant prior to watchman    -Continues to have atypical chest pains.  If this does not improve at his next follow-up, will strongly consider repeat coronary angiogram with possible PCI    Paroxysmal atrial fibrillation (CMS/HCC)  Sick sinus syndrome (CMS/HCC)  -Continue sotalol, renally dosed.  Repeat BMP today  -Prior bleeding reportedly with 4 different anticoagulants at an outside facility.  The patient does not recall bleeding diatheses but knows that he did not tolerate the medicines well.  -Referral to EP for Watchman  -If a candidate for watchman, would switch Plavix to the anticoagulant of choice prior to watchman implant.    Essential hypertension  -Continue current meds    MATEUS/CKD  -Repeat BMP    - Return in about 6 weeks (around 3/2/2020).    Ej Livingston MD, MSc, FACC

## 2020-01-20 NOTE — TELEPHONE ENCOUNTER
Patient contacted to review lab results. Pt verbalizes understanding, all questions answered at this time.

## 2020-01-20 NOTE — TELEPHONE ENCOUNTER
----- Message from Ej Livingston MD sent at 1/20/2020  2:39 PM EST -----  Please let the patient know his blood test today showed a stable kidney function

## 2020-03-09 ENCOUNTER — OFFICE VISIT (OUTPATIENT)
Dept: CARDIOLOGY | Facility: CLINIC | Age: 80
End: 2020-03-09

## 2020-03-09 VITALS
OXYGEN SATURATION: 98 % | HEIGHT: 74 IN | BODY MASS INDEX: 30.9 KG/M2 | DIASTOLIC BLOOD PRESSURE: 80 MMHG | HEART RATE: 60 BPM | WEIGHT: 240.8 LBS | SYSTOLIC BLOOD PRESSURE: 124 MMHG

## 2020-03-09 DIAGNOSIS — E78.2 MIXED HYPERLIPIDEMIA: ICD-10-CM

## 2020-03-09 DIAGNOSIS — I10 ESSENTIAL HYPERTENSION: ICD-10-CM

## 2020-03-09 DIAGNOSIS — R07.2 PRECORDIAL PAIN: ICD-10-CM

## 2020-03-09 DIAGNOSIS — I25.118 CORONARY ARTERY DISEASE OF NATIVE ARTERY OF NATIVE HEART WITH STABLE ANGINA PECTORIS (HCC): Primary | ICD-10-CM

## 2020-03-09 DIAGNOSIS — I49.5 SICK SINUS SYNDROME (HCC): ICD-10-CM

## 2020-03-09 DIAGNOSIS — I48.0 PAROXYSMAL ATRIAL FIBRILLATION (HCC): ICD-10-CM

## 2020-03-09 PROCEDURE — 99214 OFFICE O/P EST MOD 30 MIN: CPT | Performed by: INTERNAL MEDICINE

## 2020-03-09 PROCEDURE — 93000 ELECTROCARDIOGRAM COMPLETE: CPT | Performed by: INTERNAL MEDICINE

## 2020-03-09 RX ORDER — CLOPIDOGREL BISULFATE 75 MG/1
75 TABLET ORAL DAILY
Qty: 30 TABLET | Refills: 5 | Status: SHIPPED | OUTPATIENT
Start: 2020-03-09 | End: 2020-08-24 | Stop reason: SDUPTHER

## 2020-03-09 NOTE — PROGRESS NOTES
[facility]   [address] [place] [address], [address]  [place], [place], [address] [phone]  WWW.[facility]           OUTPATIENT CLINIC FOLLOW UP NOTE    Patient Care Team:  Patient Care Team:  Gee Rosen MD as PCP - General (Family Medicine)    Subjective:      Chief Complaint   Patient presents with   • Coronary Artery Disease     F/u       HPI:    Fabian Chaparro is a 79 y.o. male.    Cardiac problem list:  1. CAD   1. 3.5 x 12 mm BMS to the LAD in 3/2010  2. NSTEMI 2010 s/p LAD ISR s/p MOISES  3. NSTEMI 3/2012 and 10/2013 [facility] without new culprits for PCI  4.  [facility] with patent stent and a proximal LAD lesion as well as a mid circumflex lesion that were not found to be significant by FFR.  [facility]  5. 2019 atypical chest pain syndrome with a stress test negative for ischemia  2. Paroxysmal atrial fibrillation, CHADSVASc of 4, intolerant to anticoagulants due to bleeding (details of bleeding not known) including Xarelto, Pradaxa, Eliquis, Savaysa. Reportedly, sister was to get a Watchman, but  of a stroke prior to work up.  3. Symptomatic bradycardia status post pacemaker placement  4. Remote nicotine dependence  5. CKD 3 at least as of   6. 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 Orthostatic symptoms   8. Essential hypertension  9. Mixed hyperlipidemia    The patient presents today for follow-up.  Since the patient was last seen he reports he is still had left-sided, sharp chest pain.  These episodes are brief in nature and resolve spontaneously.  He has not taken nitroglycerin for these events.  He also reports palpitations and baseline chronic shortness of breath.  He denies lightheadedness, syncope, orthopnea, PND, or lower extremity edema.    He also notes that his PCP decreased his clopidogrel to 1/2 tablet daily.    Review of Systems:  Positive for palpitations, sharp fleeting chest pain  Negative for dyspnea  "with exertion, orthopnea, PND, lower extremity edema, lightheadedness, syncope.     PFSH:  Patient Active Problem List   Diagnosis   • Coronary artery disease of native artery of native heart with stable angina pectoris (CMS/HCC)   • Paroxysmal atrial fibrillation (CMS/HCC)   • Sick sinus syndrome (CMS/HCC)   • Essential hypertension   • Mixed hyperlipidemia   • Chest pain         Current Outpatient Medications:   •  amLODIPine (NORVASC) 10 MG tablet, Take 1 tablet by mouth Daily., Disp: 30 tablet, Rfl: 1  •  aspirin 81 MG chewable tablet, Chew 81 mg Daily., Disp: , Rfl:   •  clopidogrel (PLAVIX) 75 MG tablet, Take 75 mg by mouth Daily., Disp: , Rfl:   •  isosorbide mononitrate (IMDUR) 30 MG 24 hr tablet, Take 1 tablet by mouth Every Morning., Disp: 30 tablet, Rfl: 1  •  simvastatin (ZOCOR) 20 MG tablet, Take 20 mg by mouth Every Night., Disp: , Rfl:   •  sotalol (BETAPACE) 120 MG tablet, Take 1 tablet by mouth Daily., Disp: 30 tablet, Rfl: 2    No Known Allergies     reports that he quit smoking about 20 years ago. His smoking use included cigarettes. He has never used smokeless tobacco.    Family History   Problem Relation Age of Onset   • Atrial fibrillation Father    • Atrial fibrillation Sister    • Atrial fibrillation Brother          Objective:   Physical exam:  /80 (BP Location: Left arm, Patient Position: Sitting)   Pulse 60   Ht 188 cm (74\")   Wt 109 kg (240 lb 12.8 oz)   SpO2 98%   BMI 30.92 kg/m²   CONSTITUTIONAL: No acute distress  RESPIRATORY: Normal effort. Clear to auscultation bilaterally without wheezing or rales  CARDIOVASCULAR:   Regular rate and rhythm with normal S1 and S2. Without murmur, gallop or rub. Normal radial pulse.   PSYCH: Normal affect    Labs:    BUN   Date Value Ref Range Status   01/20/2020 17 8 - 23 mg/dL Final     Creatinine   Date Value Ref Range Status   01/20/2020 1.52 (H) 0.76 - 1.27 mg/dL Final     Potassium   Date Value Ref Range Status   01/20/2020 4.4 3.5 - " 5.2 mmol/L Final     ALT (SGPT)   Date Value Ref Range Status   11/26/2019 13 1 - 41 U/L Final     AST (SGOT)   Date Value Ref Range Status   11/26/2019 15 1 - 40 U/L Final       Lab Results   Component Value Date    CHOL 139 11/27/2019     Lab Results   Component Value Date    TRIG 112 11/27/2019     Lab Results   Component Value Date    HDL 40 11/27/2019     Lab Results   Component Value Date    LDL 77 11/27/2019     No components found for: LDLDIRECTC    Diagnostic Data:      ECG 12 Lead  Date/Time: 3/9/2020 10:04 AM  Performed by: Ej Livingston MD  Authorized by: Ej Livingston MD   Comparison: compared with previous ECG from 1/20/2020  Similar to previous ECG  Rhythm: paced  Rate: normal  BPM: 60  Comments: Atrial paced with prolonged AV conduction   ms  QTc 456 ms  T wave abnormality  Left axis deviation          DEVICE INTERROGATION:  [facility] [place], Interrogation date 1/20/20-   RA pacing 91%, RV pacing <1%. P wave is 8.1 mV with a threshold of 0.9 V at 0.4 msec and an impedance of 839 ohms. R wave is 10.9 mV with a threshold of 1.1 V at 0.4 msec and an impedance of 686 ohms. Battery voltage is 6.5yrs.  No A. fib noted, no significant ectopy burden.  Appears mild.    DEVICE INTERROGATION:  [facility], Interrogation date 11/27/19-   RA pacing 87%, RV pacing 97%. P wave is 6.6 mV with a threshold of 0.8 V at 0.4 msec and an impedance of 750 ohms. R wave is 9.7 mV with a threshold of 1.2 V at 0.4 msec and an impedance of 656 ohms. Battery voltage is 6.5yr. Since 11/22/19, 108 PACs, 26PVCs, 0% AFib    Stress test 11/2019  · Left ventricular ejection fraction is normal (Calculated EF = 62%).  · Myocardial perfusion imaging indicates a normal myocardial perfusion study with no evidence of ischemia.  · Impressions are consistent with a low risk study.    Echo 11/2019  · Left ventricular systolic function is normal. Estimated EF = 60%.  · Left ventricular diastolic dysfunction (grade I a) consistent with impaired  relaxation.       Assessment and Plan:   Fabian was seen today for atrial fibrillation, coronary artery disease, hypertension and sss.    Diagnoses and all orders for this visit:    Coronary artery disease of native artery of native heart with stable angina pectoris (CMS/HCC)  Mixed hyperlipidemia  -Continued chest pains.  -Increase clopidogrel back to 75 mg p.o. daily.  -We will arrange for cardiac catheterization via the right radial approach if [place] date of procedure is acceptable.  -Continue other current related medications.    Paroxysmal atrial fibrillation (CMS/HCC)  Sick sinus syndrome (CMS/HCC)  -Continue sotalol, renally dosed.    -Prior bleeding reportedly with 4 different anticoagulants at an outside facility.  The patient does not recall bleeding diatheses but knows that he did not tolerate the medicines well.  -The patient request to continue thinking about watchman placement.  -We will attempt to obtain prior EKG verifying atrial fibrillation.    Essential hypertension  -Stable, continue current meds.    MATEUS/CKD  -Stable  -We will need repeat BMP prior to cardiac catheterization.    - No follow-ups on file.  Will be determined after cardiac catheterization.    Scribed for Ej Livingston MD by Junie Johnson, APRN   3/9/2020  10:03 AM    I, Ej Livingston MD, personally performed the services as scribed by the above named individual. I have made any necessary edits and it is both accurate and complete.     Ej Livingston MD, MSc, [facility]  Interventional Cardiology  [facility]

## 2020-03-10 ENCOUNTER — PREP FOR SURGERY (OUTPATIENT)
Dept: OTHER | Facility: HOSPITAL | Age: 80
End: 2020-03-10

## 2020-03-10 DIAGNOSIS — I25.118 CORONARY ARTERY DISEASE OF NATIVE ARTERY OF NATIVE HEART WITH STABLE ANGINA PECTORIS (HCC): Primary | ICD-10-CM

## 2020-03-10 RX ORDER — SODIUM CHLORIDE 0.9 % (FLUSH) 0.9 %
3 SYRINGE (ML) INJECTION EVERY 12 HOURS SCHEDULED
Status: CANCELLED | OUTPATIENT
Start: 2020-03-10

## 2020-03-10 RX ORDER — ASPIRIN 81 MG/1
81 TABLET ORAL DAILY
Status: CANCELLED | OUTPATIENT
Start: 2020-03-11

## 2020-03-10 RX ORDER — SODIUM CHLORIDE 0.9 % (FLUSH) 0.9 %
10 SYRINGE (ML) INJECTION AS NEEDED
Status: CANCELLED | OUTPATIENT
Start: 2020-03-10

## 2020-03-10 RX ORDER — ASPIRIN 81 MG/1
324 TABLET, CHEWABLE ORAL ONCE
Status: CANCELLED | OUTPATIENT
Start: 2020-03-10 | End: 2020-03-10

## 2020-06-29 ENCOUNTER — DOCUMENTATION (OUTPATIENT)
Dept: CARDIOLOGY | Facility: CLINIC | Age: 80
End: 2020-06-29

## 2020-06-29 NOTE — PROGRESS NOTES
-Spoke with patient's son today.  His son, Bhargav, states that his father has not been having as prominent chest pains over the last few months during coronavirus quarantine.  -Was to undergo a heart catheterization with us, but this got delayed.  -We will see the patient in the clinic in the near future to reassess his current symptom pattern.  We will continue to reassess the risks and benefits of invasive procedures like heart catheterization as well as the Watchman left atrial appendage closure    Ej Livingston MD, MSc, [facility]  Interventional Cardiology  [facility]

## 2020-08-24 ENCOUNTER — HOSPITAL ENCOUNTER (OUTPATIENT)
Dept: GENERAL RADIOLOGY | Facility: HOSPITAL | Age: 80
Discharge: HOME | End: 2020-08-24
Admitting: INTERNAL MEDICINE

## 2020-08-24 ENCOUNTER — OFFICE VISIT (OUTPATIENT)
Dept: CARDIOLOGY | Facility: CLINIC | Age: 80
End: 2020-08-24

## 2020-08-24 VITALS
WEIGHT: 231 LBS | HEIGHT: 74 IN | BODY MASS INDEX: 29.65 KG/M2 | TEMPERATURE: 97.7 F | SYSTOLIC BLOOD PRESSURE: 136 MMHG | DIASTOLIC BLOOD PRESSURE: 66 MMHG | OXYGEN SATURATION: 96 % | HEART RATE: 67 BPM

## 2020-08-24 DIAGNOSIS — I25.118 CORONARY ARTERY DISEASE OF NATIVE ARTERY OF NATIVE HEART WITH STABLE ANGINA PECTORIS (HCC): Primary | ICD-10-CM

## 2020-08-24 DIAGNOSIS — R06.00 DYSPNEA, UNSPECIFIED TYPE: ICD-10-CM

## 2020-08-24 DIAGNOSIS — I49.5 SICK SINUS SYNDROME (HCC): ICD-10-CM

## 2020-08-24 DIAGNOSIS — I10 ESSENTIAL HYPERTENSION: ICD-10-CM

## 2020-08-24 DIAGNOSIS — E78.2 MIXED HYPERLIPIDEMIA: ICD-10-CM

## 2020-08-24 DIAGNOSIS — I48.0 PAROXYSMAL ATRIAL FIBRILLATION (HCC): ICD-10-CM

## 2020-08-24 PROCEDURE — 93280 PM DEVICE PROGR EVAL DUAL: CPT | Performed by: INTERNAL MEDICINE

## 2020-08-24 PROCEDURE — 93000 ELECTROCARDIOGRAM COMPLETE: CPT | Performed by: INTERNAL MEDICINE

## 2020-08-24 PROCEDURE — 71046 X-RAY EXAM CHEST 2 VIEWS: CPT

## 2020-08-24 PROCEDURE — 99214 OFFICE O/P EST MOD 30 MIN: CPT | Performed by: INTERNAL MEDICINE

## 2020-08-24 RX ORDER — CLOPIDOGREL BISULFATE 75 MG/1
75 TABLET ORAL DAILY
Qty: 90 TABLET | Refills: 3 | Status: SHIPPED | OUTPATIENT
Start: 2020-08-24 | End: 2021-09-20 | Stop reason: SDUPTHER

## 2020-08-24 NOTE — PROGRESS NOTES
[place] [facility]   [address], [address]  [place], [place], [address]    [phone]  WWW.[facility]           OUTPATIENT CLINIC FOLLOW UP NOTE    Patient Care Team:  Patient Care Team:  Gee Rosen MD as PCP - General (Family Medicine)    Subjective:      Chief Complaint   Patient presents with   • Coronary artery disease of native artery of native heart wit       HPI:    Fabian Chaparro is a 80 y.o. male.    Cardiac problem list:  1. CAD   1. 3.5 x 12 mm BMS to the LAD in 3/2010  2. NSTEMI 2010 s/p LAD ISR s/p MOISES  3. NSTEMI 3/2012 and 10/2013 [facility] without new culprits for PCI  4.  C with patent stent and a proximal LAD lesion as well as a mid circumflex lesion that were not found to be significant by FFR.  [facility]  5. 2019 atypical chest pain syndrome with a stress test negative for ischemia  2. Paroxysmal atrial fibrillation  1. CHADSVASc of 4, intolerant to anticoagulants due to bleeding (details of bleeding not known) including Xarelto, Pradaxa, Eliquis, Savaysa.  2. Reportedly, sister was to get a Watchman, but  of a stroke prior to work up.  3. Symptomatic bradycardia status post pacemaker placement  4. Remote nicotine dependence  5. CKD 3 at least as of   6. 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 Orthostatic symptoms   8. Essential hypertension  9. Mixed hyperlipidemia    The patient presents today for follow-up.     Since his last visit the patient states he has had decreased chest pains.  Has minimal swelling.  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    No significant palpitations.    Review of Systems:  Positive for palpitations, sharp fleeting chest pain  Negative for dyspnea with exertion, orthopnea, PND, lower extremity edema, lightheadedness, syncope.     PFSH:  Patient Active Problem List   Diagnosis  "  • Coronary artery disease of native artery of native heart with stable angina pectoris (CMS/HCC)   • Paroxysmal atrial fibrillation (CMS/HCC)   • Sick sinus syndrome (CMS/HCC)   • Essential hypertension   • Mixed hyperlipidemia   • Chest pain   • Precordial pain         Current Outpatient Medications:   •  amLODIPine (NORVASC) 10 MG tablet, Take 1 tablet by mouth Daily., Disp: 30 tablet, Rfl: 1  •  aspirin 81 MG chewable tablet, Chew 81 mg Daily., Disp: , Rfl:   •  clopidogrel (PLAVIX) 75 MG tablet, Take 1 tablet by mouth Daily., Disp: 90 tablet, Rfl: 3  •  isosorbide mononitrate (IMDUR) 30 MG 24 hr tablet, Take 1 tablet by mouth Every Morning., Disp: 30 tablet, Rfl: 1  •  simvastatin (ZOCOR) 20 MG tablet, Take 20 mg by mouth Every Night., Disp: , Rfl:   •  sotalol (BETAPACE) 120 MG tablet, Take 1 tablet by mouth Daily., Disp: 30 tablet, Rfl: 2    No Known Allergies     reports that he quit smoking about 20 years ago. His smoking use included cigarettes. He has never used smokeless tobacco.    Family History   Problem Relation Age of Onset   • Atrial fibrillation Father    • Atrial fibrillation Sister    • Atrial fibrillation Brother          Objective:   Physical exam:  /66 (BP Location: Left arm, Patient Position: Sitting, Cuff Size: Adult)   Pulse 67   Temp 97.7 °F (36.5 °C)   Ht 188 cm (74\")   Wt 105 kg (231 lb)   SpO2 96%   BMI 29.66 kg/m²   CONSTITUTIONAL: No acute distress  RESPIRATORY: Normal effort. Clear to auscultation bilaterally without wheezing or rales  CARDIOVASCULAR:   Regular rate and rhythm with normal S1 and S2. Without murmur, gallop or rub. Normal radial pulse.   PSYCH: Normal affect    Labs:    BUN   Date Value Ref Range Status   01/20/2020 17 8 - 23 mg/dL Final     Creatinine   Date Value Ref Range Status   01/20/2020 1.52 (H) 0.76 - 1.27 mg/dL Final     Potassium   Date Value Ref Range Status   01/20/2020 4.4 3.5 - 5.2 mmol/L Final     ALT (SGPT)   Date Value Ref Range Status "   11/26/2019 13 1 - 41 U/L Final     AST (SGOT)   Date Value Ref Range Status   11/26/2019 15 1 - 40 U/L Final       Lab Results   Component Value Date    CHOL 139 11/27/2019     Lab Results   Component Value Date    TRIG 112 11/27/2019     Lab Results   Component Value Date    HDL 40 11/27/2019     Lab Results   Component Value Date    LDL 77 11/27/2019     No components found for: LDLDIRECTC    Diagnostic Data:      ECG 12 Lead  Date/Time: 8/24/2020 5:30 PM  Performed by: Ej Livingston MD  Authorized by: Ej Livingston MD   Comparison: compared with previous ECG from 3/9/2020  Comparison to previous ECG: No longer a paced.  PVCs and now present.  Rhythm: sinus rhythm  Ectopy: unifocal PVCs  QRS axis: left  Comments: T wave abnormality, consider anterolateral ischemia, heart rate 67 , QTc 448          DEVICE INTERROGATION:  [facility] [place], [place]rogation date 8/24/2020-   RA pacing 81%, RV pacing <1%. P wave is 7.3 mV with a threshold of 9.8 V at 1.0 msec and an impedance of 0.4 ohms. R wave is 844 mV with a threshold of 1.3 V at 0.4 msec and an impedance of 668 ohms. Battery voltage is 6yrs. 2% AMS, 41 VHR, NSVT x 1 for 10 beats    DEVICE INTERROGATION:  [facility] [place], Interrogation date 1/20/20-   RA pacing 91%, RV pacing <1%. P wave is 8.1 mV with a threshold of 0.9 V at 0.4 msec and an impedance of 839 ohms. R wave is 10.9 mV with a threshold of 1.1 V at 0.4 msec and an impedance of 686 ohms. Battery voltage is 6.5yrs.  No A. fib noted, no significant ectopy burden.  Appears mild.    DEVICE INTERROGATION:  [facility] [place], Interrogation date 11/27/19-   RA pacing 87%, RV pacing 97%. P wave is 6.6 mV with a threshold of 0.8 V at 0.4 msec and an impedance of 750 ohms. R wave is 9.7 mV with a threshold of 1.2 V at 0.4 msec and an impedance of 656 ohms. Battery voltage is 6.5yr. Since 11/22/19, 108 PACs, 26PVCs, 0% AFib    Stress test 11/2019  · Left ventricular ejection fraction is normal (Calculated EF = 62%).  · Myocardial perfusion  imaging indicates a normal myocardial perfusion study with no evidence of ischemia.  · Impressions are consistent with a low risk study.    Echo 11/2019  · Left ventricular systolic function is normal. Estimated EF = 60%.  · Left ventricular diastolic dysfunction (grade I a) consistent with impaired relaxation.       Assessment and Plan:   Fabian was seen today for atrial fibrillation, coronary artery disease, hypertension and sss.    Diagnoses and all orders for this visit:    Coronary artery disease of native artery of native heart with stable angina pectoris (CMS/HCC)  Mixed hyperlipidemia  -Chest pains have improved and are stable  -Continue aspirin, clopidogrel, Imdur, beta-blocker, calcium channel blocker, statin  -Low threshold for repeat heart catheterization if chest pains worsen again like they did an early summer 2020.  Would need to recheck his kidney function in advance of the procedure if moving forward with a heart cath    Paroxysmal atrial fibrillation (CMS/HCC)  Sick sinus syndrome (CMS/HCC)  -Continue sotalol, renally dosed.    -Prior bleeding reportedly with 4 different anticoagulants at an outside facility.  The patient does not recall bleeding diatheses but knows that he did not tolerate the medicines well.  -No definitive prior EKG verifying atrial fibrillation but is having mode switches that appear to be AFib (8/4/20200  -Patient previously offered a Watchman, has continued to decline  -Continue DAPT    Essential hypertension  -Stable, continue current meds.    MATEUS/CKD  -Stable    - Return in about 6 months (around 2/24/2021) for Next scheduled follow up, [facility] [place].      Ej Livingston MD, MSc, [facility]  Interventional Cardiology  [place] [facility] at [facility]

## 2020-08-28 ENCOUNTER — TELEPHONE (OUTPATIENT)
Dept: CARDIOLOGY | Facility: CLINIC | Age: 80
End: 2020-08-28

## 2020-08-28 NOTE — TELEPHONE ENCOUNTER
----- Message from Ej Livingston MD sent at 8/27/2020  6:05 PM EDT -----  Please let the patient know his chest x-ray looked just fine.

## 2020-08-28 NOTE — TELEPHONE ENCOUNTER
Patient contacted to review xray results. No answer, no voicemail available. Will await return call.

## 2021-06-28 ENCOUNTER — OFFICE VISIT (OUTPATIENT)
Dept: CARDIOLOGY | Facility: CLINIC | Age: 81
End: 2021-06-28

## 2021-06-28 ENCOUNTER — LAB (OUTPATIENT)
Dept: LAB | Facility: HOSPITAL | Age: 81
End: 2021-06-28

## 2021-06-28 VITALS
HEIGHT: 74 IN | OXYGEN SATURATION: 98 % | BODY MASS INDEX: 29.34 KG/M2 | HEART RATE: 63 BPM | SYSTOLIC BLOOD PRESSURE: 142 MMHG | DIASTOLIC BLOOD PRESSURE: 72 MMHG | WEIGHT: 228.6 LBS

## 2021-06-28 DIAGNOSIS — I25.118 CORONARY ARTERY DISEASE OF NATIVE ARTERY OF NATIVE HEART WITH STABLE ANGINA PECTORIS (HCC): ICD-10-CM

## 2021-06-28 DIAGNOSIS — E78.2 MIXED HYPERLIPIDEMIA: ICD-10-CM

## 2021-06-28 DIAGNOSIS — R06.00 DYSPNEA, UNSPECIFIED TYPE: ICD-10-CM

## 2021-06-28 DIAGNOSIS — I48.0 PAROXYSMAL ATRIAL FIBRILLATION (HCC): ICD-10-CM

## 2021-06-28 DIAGNOSIS — I25.118 CORONARY ARTERY DISEASE OF NATIVE ARTERY OF NATIVE HEART WITH STABLE ANGINA PECTORIS (HCC): Primary | ICD-10-CM

## 2021-06-28 DIAGNOSIS — I49.5 SICK SINUS SYNDROME (HCC): ICD-10-CM

## 2021-06-28 LAB
HCT VFR BLD AUTO: 41.8 % (ref 37.5–51)
HGB BLD-MCNC: 14.1 G/DL (ref 13–17.7)

## 2021-06-28 PROCEDURE — 93280 PM DEVICE PROGR EVAL DUAL: CPT | Performed by: INTERNAL MEDICINE

## 2021-06-28 PROCEDURE — 36415 COLL VENOUS BLD VENIPUNCTURE: CPT

## 2021-06-28 PROCEDURE — 85014 HEMATOCRIT: CPT

## 2021-06-28 PROCEDURE — 93000 ELECTROCARDIOGRAM COMPLETE: CPT | Performed by: INTERNAL MEDICINE

## 2021-06-28 PROCEDURE — 99214 OFFICE O/P EST MOD 30 MIN: CPT | Performed by: INTERNAL MEDICINE

## 2021-06-28 PROCEDURE — 80053 COMPREHEN METABOLIC PANEL: CPT

## 2021-06-28 PROCEDURE — 85018 HEMOGLOBIN: CPT

## 2021-06-28 PROCEDURE — 80061 LIPID PANEL: CPT

## 2021-06-28 RX ORDER — LEVOFLOXACIN 500 MG/1
500 TABLET, FILM COATED ORAL DAILY
COMMUNITY
End: 2022-07-18

## 2021-06-28 NOTE — PROGRESS NOTES
[place] [facility] AT [address], [address]  [place], [place], [address] [phone]  WWW.[facility]           OUTPATIENT CLINIC FOLLOW UP NOTE    Patient Care Team:  Patient Care Team:  Gee Rosen MD as PCP - General (Family Medicine)    Subjective:      Chief Complaint   Patient presents with   • Coronary Artery Disease       HPI:    Fabian Chaparro is a 81 y.o. male.    Cardiac problem list:  1. CAD   1. 3.5 x 12 mm BMS to the LAD in 3/2010  2. NSTEMI 2010 s/p LAD ISR s/p MOISES  3. NSTEMI 3/2012 and 10/2013 [facility] without new culprits for PCI  4.  [facility] with patent stent and a proximal LAD lesion as well as a mid circumflex lesion that were not found to be significant by FFR.  [facility]  5. 2019 atypical chest pain syndrome with a stress test negative for ischemia  2. Paroxysmal atrial fibrillation  1. CHADSVASc of 4, intolerant to anticoagulants due to bleeding (details of bleeding not known) including Xarelto, Pradaxa, Eliquis, Savaysa.  2. Reportedly, sister was to get a Watchman, but  of a stroke prior to work up.  3. Symptomatic bradycardia status post pacemaker placement, [employer]  4. Remote nicotine dependence  5. CKD 3 at least as of   6. 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 Orthostatic symptoms   8. Essential hypertension  9. Mixed hyperlipidemia    The patient presents today for follow-up.     Since his last visit the patient has had bouts with diverticulitis.  On antibiotics intermittently.  Reported A. fib episodes noted by [facility] [facility], Dr. Peralta.  Was recommended left atrial appendage closure by them.  Patient has been thinking about it.  Has not had chest pain.  Is active.  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  Has chronic exertional dyspnea that is unchanged.    Review of Systems:  Positive for exertional dyspnea  Negative for exertional  "chest pain, orthopnea, PND, lower extremity edema, lightheadedness, syncope.     PFSH:  Patient Active Problem List   Diagnosis   • Coronary artery disease of native artery of native heart with stable angina pectoris (CMS/HCC)   • Paroxysmal atrial fibrillation (CMS/HCC)   • Sick sinus syndrome (CMS/HCC)   • Essential hypertension   • Mixed hyperlipidemia   • Chest pain   • Precordial pain         Current Outpatient Medications:   •  amLODIPine (NORVASC) 10 MG tablet, Take 1 tablet by mouth Daily. (Patient taking differently: Take 5 mg by mouth Daily.), Disp: 30 tablet, Rfl: 1  •  aspirin 81 MG chewable tablet, Chew 81 mg Daily., Disp: , Rfl:   •  clopidogrel (PLAVIX) 75 MG tablet, Take 1 tablet by mouth Daily., Disp: 90 tablet, Rfl: 3  •  levoFLOXacin (LEVAQUIN) 500 MG tablet, Take 500 mg by mouth Daily., Disp: , Rfl:   •  simvastatin (ZOCOR) 20 MG tablet, Take 20 mg by mouth Every Night., Disp: , Rfl:   •  sotalol (BETAPACE) 120 MG tablet, Take 1 tablet by mouth Daily., Disp: 30 tablet, Rfl: 2  •  isosorbide mononitrate (IMDUR) 30 MG 24 hr tablet, Take 1 tablet by mouth Every Morning., Disp: 30 tablet, Rfl: 1    No Known Allergies     reports that he quit smoking about 21 years ago. His smoking use included cigarettes. He has never used smokeless tobacco.    Family History   Problem Relation Age of Onset   • Atrial fibrillation Father    • Atrial fibrillation Sister    • Atrial fibrillation Brother          Objective:   Physical exam:  /72 (BP Location: Right arm, Patient Position: Sitting)   Pulse 63   Ht 188 cm (74\")   Wt 104 kg (228 lb 9.6 oz)   SpO2 98%   BMI 29.35 kg/m²   CONSTITUTIONAL: No acute distress  RESPIRATORY: Normal effort. Clear to auscultation bilaterally without wheezing or rales  CARDIOVASCULAR:   Regular rate and rhythm with normal S1 and S2. Without murmur, gallop or rub. Normal radial pulse.  Mild lower extremity swelling bilaterally  PSYCH: Normal affect    Labs:    BUN   Date " Value Ref Range Status   01/20/2020 17 8 - 23 mg/dL Final     Creatinine   Date Value Ref Range Status   01/20/2020 1.52 (H) 0.76 - 1.27 mg/dL Final     Potassium   Date Value Ref Range Status   01/20/2020 4.4 3.5 - 5.2 mmol/L Final     ALT (SGPT)   Date Value Ref Range Status   11/26/2019 13 1 - 41 U/L Final     AST (SGOT)   Date Value Ref Range Status   11/26/2019 15 1 - 40 U/L Final       Lab Results   Component Value Date    CHOL 139 11/27/2019     Lab Results   Component Value Date    TRIG 112 11/27/2019     Lab Results   Component Value Date    HDL 40 11/27/2019     Lab Results   Component Value Date    LDL 77 11/27/2019     No components found for: LDLDIRECTC    Diagnostic Data:      ECG 12 Lead    Date/Time: 6/28/2021 3:38 PM  Performed by: Ej Livingston MD  Authorized by: Ej Livingston MD   Comparison: compared with previous ECG from 8/24/2020  Comparison to previous ECG: Now atrial paced.  PVC present  Rhythm: paced  Comments: A paced, T wave abnormality, nonspecific.  Heart rate 63, , QTc 427, PVC present          [place]  [facility] 8/24/2020-   RA pacing 81%, RV pacing <1%. P wave is 7.3 mV with a threshold of 9.8 V at 1.0 msec and an impedance of 0.4 ohms. R wave is 844 mV with a threshold of 1.3 V at 0.4 msec and an impedance of 668 ohms. Battery voltage is 6yrs. 2% AMS, 41 VHR, NSVT x 1 for 10 beats    Stress test 11/2019  · Left ventricular ejection fraction is normal (Calculated EF = 62%).  · Myocardial perfusion imaging indicates a normal myocardial perfusion study with no evidence of ischemia.  · Impressions are consistent with a low risk study.    Echo 11/2019  · Left ventricular systolic function is normal. Estimated EF = 60%.  · Left ventricular diastolic dysfunction (grade I a) consistent with impaired relaxation.       Assessment and Plan:     Coronary artery disease of native artery of native heart with stable angina pectoris   Mixed  hyperlipidemia  CKD  -Continue aspirin, clopidogrel, Imdur, beta-blocker, calcium channel blocker, statin  -Low threshold for repeat heart catheterization if chest pains worsen again like they did an early summer 2020.   -Repeat labs today including renal function    Paroxysmal atrial fibrillation   Sick sinus syndrome   -Continue sotalol, renally dosed at once daily.    -Prior bleeding reportedly with 4 different anticoagulants at an outside facility.  The patient does not recall bleeding diatheses but knows that he did not tolerate the medicines well.  -No definitive prior EKG verifying atrial fibrillation but is having mode switches that appear to be AFib (8/4/2020).  Reported to have further findings of A. fib on outlying facility device interrogations, Dr. Peralta, [facility]  -We will try to obtain 1 of those reports to confirm episodes of A. fib (noted earlier this year when he was having bouts of diverticulitis)  -Today's device interrogation only reflects the last month of monitoring.  The patient had 3 one-to-one atrial tachycardia episodes with a rate of 180 bpm.  Events are short lasting, less than 1 minute  -Continued device follow-up with Dr. Peralta at [facility]  -Discussed Watchman with the patient again.  Patient to think about it  -Continue DAPT    Essential hypertension  -Stable, continue current meds.    - Return in about 1 year (around 6/28/2022) for Next scheduled follow-up with an ECG.      Ej Livingston MD, MSc, [facility]  Interventional Cardiology  [place] [facility] at [facility]

## 2021-06-29 ENCOUNTER — TELEPHONE (OUTPATIENT)
Dept: CARDIOLOGY | Facility: CLINIC | Age: 81
End: 2021-06-29

## 2021-06-29 LAB
ALBUMIN SERPL-MCNC: 4.1 G/DL (ref 3.5–5.2)
ALBUMIN/GLOB SERPL: 1.9 G/DL
ALP SERPL-CCNC: 70 U/L (ref 39–117)
ALT SERPL W P-5'-P-CCNC: 12 U/L (ref 1–41)
ANION GAP SERPL CALCULATED.3IONS-SCNC: 9.3 MMOL/L (ref 5–15)
AST SERPL-CCNC: 11 U/L (ref 1–40)
BILIRUB SERPL-MCNC: 0.6 MG/DL (ref 0–1.2)
BUN SERPL-MCNC: 20 MG/DL (ref 8–23)
BUN/CREAT SERPL: 11.8 (ref 7–25)
CALCIUM SPEC-SCNC: 10.4 MG/DL (ref 8.6–10.5)
CHLORIDE SERPL-SCNC: 106 MMOL/L (ref 98–107)
CHOLEST SERPL-MCNC: 146 MG/DL (ref 0–200)
CO2 SERPL-SCNC: 25.7 MMOL/L (ref 22–29)
CREAT SERPL-MCNC: 1.69 MG/DL (ref 0.76–1.27)
GFR SERPL CREATININE-BSD FRML MDRD: 39 ML/MIN/1.73
GLOBULIN UR ELPH-MCNC: 2.2 GM/DL
GLUCOSE SERPL-MCNC: 97 MG/DL (ref 65–99)
HDLC SERPL-MCNC: 37 MG/DL (ref 40–60)
LDLC SERPL CALC-MCNC: 83 MG/DL (ref 0–100)
LDLC/HDLC SERPL: 2.15 {RATIO}
POTASSIUM SERPL-SCNC: 4.5 MMOL/L (ref 3.5–5.2)
PROT SERPL-MCNC: 6.3 G/DL (ref 6–8.5)
SODIUM SERPL-SCNC: 141 MMOL/L (ref 136–145)
TRIGL SERPL-MCNC: 148 MG/DL (ref 0–150)
VLDLC SERPL-MCNC: 26 MG/DL (ref 5–40)

## 2021-06-29 NOTE — TELEPHONE ENCOUNTER
----- Message from Ej Livingston MD sent at 6/29/2021  7:59 AM EDT -----  Please let the patient know that other than his kidney function (which he is already aware about), his labs looked okay

## 2021-06-29 NOTE — TELEPHONE ENCOUNTER
Attempted to contact patient regarding lab results. No answer, no vm available. Will await return call.

## 2021-06-29 NOTE — TELEPHONE ENCOUNTER
----- Message from Ej Livingston MD sent at 6/28/2021  5:22 PM EDT -----  Please obtain a prior device interrogation report from [place] [facility], Dr. Peralta.  Once available, please place on my desk.  Thanks

## 2021-09-20 RX ORDER — CLOPIDOGREL BISULFATE 75 MG/1
75 TABLET ORAL DAILY
Qty: 90 TABLET | Refills: 3 | Status: SHIPPED | OUTPATIENT
Start: 2021-09-20 | End: 2022-07-18 | Stop reason: SDUPTHER

## 2022-07-18 ENCOUNTER — HOSPITAL ENCOUNTER (OUTPATIENT)
Dept: CARDIOLOGY | Facility: HOSPITAL | Age: 82
Discharge: HOME | End: 2022-07-18
Admitting: HOSPITALIST

## 2022-07-18 ENCOUNTER — OFFICE VISIT (OUTPATIENT)
Dept: CARDIOLOGY | Facility: CLINIC | Age: 82
End: 2022-07-18

## 2022-07-18 VITALS
OXYGEN SATURATION: 97 % | HEART RATE: 61 BPM | BODY MASS INDEX: 28.49 KG/M2 | DIASTOLIC BLOOD PRESSURE: 60 MMHG | WEIGHT: 222 LBS | SYSTOLIC BLOOD PRESSURE: 120 MMHG | HEIGHT: 74 IN

## 2022-07-18 VITALS — BODY MASS INDEX: 28.58 KG/M2 | HEIGHT: 74 IN | WEIGHT: 222.66 LBS

## 2022-07-18 DIAGNOSIS — I25.118 CORONARY ARTERY DISEASE OF NATIVE ARTERY OF NATIVE HEART WITH STABLE ANGINA PECTORIS: ICD-10-CM

## 2022-07-18 DIAGNOSIS — R06.09 DYSPNEA ON EXERTION: ICD-10-CM

## 2022-07-18 DIAGNOSIS — I48.0 PAROXYSMAL ATRIAL FIBRILLATION: ICD-10-CM

## 2022-07-18 DIAGNOSIS — I25.118 CORONARY ARTERY DISEASE OF NATIVE ARTERY OF NATIVE HEART WITH STABLE ANGINA PECTORIS: Primary | ICD-10-CM

## 2022-07-18 DIAGNOSIS — I49.5 SICK SINUS SYNDROME: ICD-10-CM

## 2022-07-18 DIAGNOSIS — I10 ESSENTIAL HYPERTENSION: ICD-10-CM

## 2022-07-18 DIAGNOSIS — E78.2 MIXED HYPERLIPIDEMIA: ICD-10-CM

## 2022-07-18 LAB
BH CV ECHO MEAS - AO MAX PG: 6.4 MMHG
BH CV ECHO MEAS - AO MEAN PG: 2.9 MMHG
BH CV ECHO MEAS - AO ROOT DIAM: 3.5 CM
BH CV ECHO MEAS - AO V2 MAX: 126.8 CM/SEC
BH CV ECHO MEAS - AO V2 VTI: 26.9 CM
BH CV ECHO MEAS - AVA(I,D): 3.1 CM2
BH CV ECHO MEAS - EDV(CUBED): 64.8 ML
BH CV ECHO MEAS - EDV(MOD-SP2): 89 ML
BH CV ECHO MEAS - EDV(MOD-SP4): 86.8 ML
BH CV ECHO MEAS - EF(MOD-BP): 60.3 %
BH CV ECHO MEAS - EF(MOD-SP2): 58.5 %
BH CV ECHO MEAS - EF(MOD-SP4): 60.8 %
BH CV ECHO MEAS - ESV(CUBED): 12.6 ML
BH CV ECHO MEAS - ESV(MOD-SP2): 36.9 ML
BH CV ECHO MEAS - ESV(MOD-SP4): 34 ML
BH CV ECHO MEAS - FS: 42.1 %
BH CV ECHO MEAS - IVS/LVPW: 0.98 CM
BH CV ECHO MEAS - IVSD: 1.07 CM
BH CV ECHO MEAS - LA DIMENSION: 4 CM
BH CV ECHO MEAS - LAT PEAK E' VEL: 6.9 CM/SEC
BH CV ECHO MEAS - LV DIASTOLIC VOL/BSA (35-75): 38.2 CM2
BH CV ECHO MEAS - LV MASS(C)D: 142.3 GRAMS
BH CV ECHO MEAS - LV MAX PG: 4.2 MMHG
BH CV ECHO MEAS - LV MEAN PG: 1.97 MMHG
BH CV ECHO MEAS - LV SYSTOLIC VOL/BSA (12-30): 15 CM2
BH CV ECHO MEAS - LV V1 MAX: 102.4 CM/SEC
BH CV ECHO MEAS - LV V1 VTI: 23.9 CM
BH CV ECHO MEAS - LVIDD: 4 CM
BH CV ECHO MEAS - LVIDS: 2.33 CM
BH CV ECHO MEAS - LVOT AREA: 3.5 CM2
BH CV ECHO MEAS - LVOT DIAM: 2.11 CM
BH CV ECHO MEAS - LVPWD: 1.09 CM
BH CV ECHO MEAS - MED PEAK E' VEL: 4.9 CM/SEC
BH CV ECHO MEAS - MV A MAX VEL: 89.1 CM/SEC
BH CV ECHO MEAS - MV DEC SLOPE: 245.8 CM/SEC2
BH CV ECHO MEAS - MV DEC TIME: 0.23 MSEC
BH CV ECHO MEAS - MV E MAX VEL: 58.3 CM/SEC
BH CV ECHO MEAS - MV E/A: 0.65
BH CV ECHO MEAS - MV MAX PG: 3.2 MMHG
BH CV ECHO MEAS - MV MEAN PG: 1.15 MMHG
BH CV ECHO MEAS - MV P1/2T: 72.6 MSEC
BH CV ECHO MEAS - MV V2 VTI: 24.1 CM
BH CV ECHO MEAS - MVA(P1/2T): 3 CM2
BH CV ECHO MEAS - MVA(VTI): 3.5 CM2
BH CV ECHO MEAS - PA ACC TIME: 0.13 SEC
BH CV ECHO MEAS - PA PR(ACCEL): 20.4 MMHG
BH CV ECHO MEAS - PA V2 MAX: 68.6 CM/SEC
BH CV ECHO MEAS - PI END-D VEL: 131.1 CM/SEC
BH CV ECHO MEAS - SI(MOD-SP2): 22.9 ML/M2
BH CV ECHO MEAS - SI(MOD-SP4): 23.2 ML/M2
BH CV ECHO MEAS - SV(LVOT): 83.9 ML
BH CV ECHO MEAS - SV(MOD-SP2): 52.1 ML
BH CV ECHO MEAS - SV(MOD-SP4): 52.8 ML
BH CV ECHO MEAS - TAPSE (>1.6): 1.89 CM
BH CV ECHO MEASUREMENTS AVERAGE E/E' RATIO: 9.88
BH CV VAS BP LEFT ARM: NORMAL MMHG
BH CV XLRA - RV BASE: 3.3 CM
BH CV XLRA - RV LENGTH: 6.5 CM
BH CV XLRA - RV MID: 2.7 CM
BH CV XLRA - TDI S': 12.2 CM/SEC
LEFT ATRIUM VOLUME INDEX: 21.4 ML/M2
MAXIMAL PREDICTED HEART RATE: 138 BPM
STRESS TARGET HR: 117 BPM

## 2022-07-18 PROCEDURE — 99214 OFFICE O/P EST MOD 30 MIN: CPT | Performed by: INTERNAL MEDICINE

## 2022-07-18 PROCEDURE — 93000 ELECTROCARDIOGRAM COMPLETE: CPT | Performed by: INTERNAL MEDICINE

## 2022-07-18 PROCEDURE — 93306 TTE W/DOPPLER COMPLETE: CPT | Performed by: INTERNAL MEDICINE

## 2022-07-18 PROCEDURE — 93306 TTE W/DOPPLER COMPLETE: CPT

## 2022-07-18 PROCEDURE — 93280 PM DEVICE PROGR EVAL DUAL: CPT | Performed by: INTERNAL MEDICINE

## 2022-07-18 RX ORDER — CLOPIDOGREL BISULFATE 75 MG/1
75 TABLET ORAL DAILY
Qty: 90 TABLET | Refills: 3 | Status: SHIPPED | OUTPATIENT
Start: 2022-07-18

## 2022-07-18 RX ORDER — NITROGLYCERIN 0.4 MG/1
TABLET SUBLINGUAL
Qty: 100 TABLET | Refills: 11 | Status: SHIPPED | OUTPATIENT
Start: 2022-07-18

## 2022-07-18 NOTE — PROGRESS NOTES
[place] [facility] AT [address], [address]  [place], [place], [address] [phone]  WWW.[facility]           OUTPATIENT CLINIC FOLLOW UP NOTE    Patient Care Team:  Patient Care Team:  Gee Rosen MD as PCP - General (Family Medicine)  Ej Livingston MD as Consulting Physician (Cardiology)    Subjective:      Chief Complaint   Patient presents with   • Coronary Artery Disease   • Shortness of Breath   • Dizziness       HPI:    Fabian Chaparro is a 82 y.o. male.    Cardiac problem list:  1. CAD   1. 3.5 x 12 mm BMS to the LAD in 3/2010  2. NSTEMI 2010 s/p LAD ISR s/p MOISES  3. NSTEMI 3/2012 and 10/2013 [facility] without new culprits for PCI  4.  [facility] with patent stent and a proximal LAD lesion as well as a mid circumflex lesion that were not found to be significant by FFR.  [facility]  5. 2019 atypical chest pain syndrome with a stress test negative for ischemia  2. Paroxysmal atrial fibrillation  1. CHADSVASc of 4, intolerant to anticoagulants due to bleeding (details of bleeding not known) including Xarelto, Pradaxa, Eliquis, Savaysa.  2. Reportedly, sister was to get a Watchman, but  of a stroke prior to work up.  3. Symptomatic bradycardia status post pacemaker placement, [employer]  4. Remote nicotine dependence  5. CKD 3 at least as of   6. 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 Orthostatic symptoms   8. Essential hypertension  9. Mixed hyperlipidemia    The patient presents today for follow-up.     Since his last visit the patient has been having worsening dyspnea on exertion particularly in the last 3 months.  Can only walk short distance before he gets short of breath and has to rest.  Has also been having brief, sharp chest pains almost daily.  His chest pain is relieved with rest.  Remote device interrogations with Dr. Peralta in [place] but no recent in person device check. Denies lower  "extremity edema, syncope.     Review of Systems:  As noted in HPI.      PFSH:  Patient Active Problem List   Diagnosis   • Coronary artery disease of native artery of native heart with stable angina pectoris (HCC)   • Paroxysmal atrial fibrillation (HCC)   • Sick sinus syndrome (HCC)   • Essential hypertension   • Mixed hyperlipidemia   • Chest pain   • Precordial pain         Current Outpatient Medications:   •  amLODIPine (NORVASC) 10 MG tablet, Take 1 tablet by mouth Daily. (Patient taking differently: Take 5 mg by mouth Daily.), Disp: 30 tablet, Rfl: 1  •  aspirin 81 MG EC tablet, Chew 81 mg Daily., Disp: , Rfl:   •  clopidogrel (PLAVIX) 75 MG tablet, Take 1 tablet by mouth Daily., Disp: 90 tablet, Rfl: 3  •  O2 (OXYGEN), Inhale 2 L/min As Needed., Disp: , Rfl:   •  simvastatin (ZOCOR) 20 MG tablet, Take 20 mg by mouth Every Night., Disp: , Rfl:   •  sotalol (BETAPACE) 120 MG tablet, Take 1 tablet by mouth Daily., Disp: 30 tablet, Rfl: 2  •  isosorbide mononitrate (IMDUR) 30 MG 24 hr tablet, Take 1 tablet by mouth Every Morning., Disp: 30 tablet, Rfl: 1  •  nitroglycerin (NITROSTAT) 0.4 MG SL tablet, 1 under the tongue as needed for angina, may repeat q5mins for up three doses, Disp: 100 tablet, Rfl: 11    No Known Allergies     reports that he quit smoking about 22 years ago. His smoking use included cigarettes. He has never used smokeless tobacco.      Objective:   Physical exam:  /60 (BP Location: Right arm, Patient Position: Sitting)   Pulse 61   Ht 188 cm (74\")   Wt 101 kg (222 lb)   SpO2 97%   BMI 28.50 kg/m²   CONSTITUTIONAL: No acute distress  RESPIRATORY: Normal effort. Clear to auscultation bilaterally without wheezing or rales  CARDIOVASCULAR:   Regular rate and rhythm with normal S1 and S2. Without murmur, gallop or rub. Normal radial pulse.  No significant lower extremity swelling bilaterally    Labs:        Lab Results   Component Value Date    CHOL 146 06/28/2021    CHOL 139 11/27/2019 "     Lab Results   Component Value Date    TRIG 148 06/28/2021    TRIG 112 11/27/2019     Lab Results   Component Value Date    HDL 37 (L) 06/28/2021    HDL 40 11/27/2019     Lab Results   Component Value Date    LDL 83 06/28/2021     No components found for: LDLDIRECTC    Diagnostic Data:      ECG 12 Lead    Date/Time: 7/18/2022 10:11 AM  Performed by: Ej Livingston MD  Authorized by: Ej Livingston MD   Comparison: compared with previous ECG from 6/28/2021  Comparison to previous ECG: Now with T wave inversions  Rhythm: paced  Rate: normal  BPM: 60  Other findings: T wave abnormality          [place]  [facility]24[place]2020-   RA pacing 81%, RV pacing <1%. P wave is 7.3 mV with a threshold of 9.8 V at 1.0 msec and an impedance of 0.4 ohms. R wave is 844 mV with a threshold of 1.3 V at 0.4 msec and an impedance of 668 ohms. Battery voltage is 6yrs. 2% AMS, 41 VHR, NSVT x 1 for 10 beats    [place]  [facility] [place] 7/18/2022- RA pacing 67%, RV pacing <1%. P wave is 6.2 mV with a threshold of 0.8V @ 0.5 msec and an impedance of 804 ohms. R wave is 10.2 mV with a threshold of 1.2 V @ 0.5 msec and an impedance of 678 ohms.  Battery voltage is 3.5-4 years.  6% mode switched since 6/2021, with nonsustained V and atrial tach on 7/04/2022, no SVT since 4/23/2022.      Stress test 11/2019  · Left ventricular ejection fraction is normal (Calculated EF = 62%).  · Myocardial perfusion imaging indicates a normal myocardial perfusion study with no evidence of ischemia.  · Impressions are consistent with a low risk study.    Echo 11/2019  · Left ventricular systolic function is normal. Estimated EF = 60%.  · Left ventricular diastolic dysfunction (grade I a) consistent with impaired relaxation.       Assessment and Plan:     Coronary artery disease of native artery of native heart with stable angina pectoris   Mixed hyperlipidemia  CKD  -Frequent chest pain and dyspnea on exertion  concerning for angina.  -Dyspnea with exertion, notable over last 3 mos. Echocardiogram today.  -Ongoing exertional chest pain; repeat Lexiscan nuclear stress test to evaluate for evidence of ischemia.  -Repeat labs today including CBC, CMP, lipid panel, TSH with reflexive T4, proBNP  -Sublingual nitroglycerin 0.4 mg as needed for chest pain.   -Continue aspirin, clopidogrel, Imdur, beta-blocker, calcium channel blocker, statin    Paroxysmal atrial fibrillation   Sick sinus syndrome   -Continue sotalol, renally dosed at once daily.    -Prior bleeding reportedly with 4 different anticoagulants at an outside facility.  The patient does not recall bleeding diatheses but knows that he did not tolerate the medicines well.  -No definitive prior EKG verifying atrial fibrillation but is having mode switches that appear to be AFib (8/4/2020).  Reported to have further findings of A. fib on outlying facility device interrogations, Dr. Peralta, [place] [facility]  -Today's device interrogation   -Continue DAPT    Essential hypertension  -Continue current meds.    - Return for Next scheduled follow up to be determined after echo and stress test. .      Scribed for Ej Livingston MD by Liberty Sarabia, RITO. 7/18/2022  10:34 EDT    I, Ej Livingston MD, personally performed the services as scribed by the above named individual. I have made any necessary edits and it is both accurate and complete.     Ej Livingston MD, MSc, FACC, [facility]  Interventional Cardiology  [facility]

## 2022-07-22 LAB
ALBUMIN SERPL-MCNC: 4.2 G/DL (ref 3.6–4.6)
ALBUMIN/GLOB SERPL: 2.1 {RATIO} (ref 1.2–2.2)
ALP SERPL-CCNC: 77 IU/L (ref 44–121)
ALT SERPL-CCNC: 14 IU/L (ref 0–44)
AMBIG ABBREV CMP14 DEFAULT: NORMAL
AMBIG ABBREV LP DEFAULT: NORMAL
AST SERPL-CCNC: 16 IU/L (ref 0–40)
BASOPHILS # BLD AUTO: 0 X10E3/UL (ref 0–0.2)
BASOPHILS NFR BLD AUTO: 1 %
BILIRUB SERPL-MCNC: 0.6 MG/DL (ref 0–1.2)
BUN SERPL-MCNC: 15 MG/DL (ref 8–27)
BUN/CREAT SERPL: 8 (ref 10–24)
CALCIUM SERPL-MCNC: 10.5 MG/DL (ref 8.6–10.2)
CHLORIDE SERPL-SCNC: 107 MMOL/L (ref 96–106)
CHOLEST SERPL-MCNC: 156 MG/DL (ref 100–199)
CO2 SERPL-SCNC: 23 MMOL/L (ref 20–29)
CREAT SERPL-MCNC: 1.78 MG/DL (ref 0.76–1.27)
EGFRCR SERPLBLD CKD-EPI 2021: 38 ML/MIN/1.73
EOSINOPHIL # BLD AUTO: 0.3 X10E3/UL (ref 0–0.4)
EOSINOPHIL NFR BLD AUTO: 5 %
ERYTHROCYTE [DISTWIDTH] IN BLOOD BY AUTOMATED COUNT: 13.3 % (ref 11.6–15.4)
GLOBULIN SER CALC-MCNC: 2 G/DL (ref 1.5–4.5)
GLUCOSE SERPL-MCNC: 117 MG/DL (ref 65–99)
HCT VFR BLD AUTO: 41.7 % (ref 37.5–51)
HDLC SERPL-MCNC: 36 MG/DL
HGB BLD-MCNC: 13.8 G/DL (ref 13–17.7)
IMM GRANULOCYTES # BLD AUTO: 0 X10E3/UL (ref 0–0.1)
IMM GRANULOCYTES NFR BLD AUTO: 0 %
LDLC SERPL CALC-MCNC: 97 MG/DL (ref 0–99)
LDLC/HDLC SERPL: 2.7 RATIO (ref 0–3.6)
LYMPHOCYTES # BLD AUTO: 1.6 X10E3/UL (ref 0.7–3.1)
LYMPHOCYTES NFR BLD AUTO: 23 %
MCH RBC QN AUTO: 29.7 PG (ref 26.6–33)
MCHC RBC AUTO-ENTMCNC: 33.1 G/DL (ref 31.5–35.7)
MCV RBC AUTO: 90 FL (ref 79–97)
MONOCYTES # BLD AUTO: 0.5 X10E3/UL (ref 0.1–0.9)
MONOCYTES NFR BLD AUTO: 7 %
NEUTROPHILS # BLD AUTO: 4.6 X10E3/UL (ref 1.4–7)
NEUTROPHILS NFR BLD AUTO: 64 %
NT-PROBNP SERPL-MCNC: 692 PG/ML (ref 0–486)
PLATELET # BLD AUTO: 196 X10E3/UL (ref 150–450)
POTASSIUM SERPL-SCNC: 4.5 MMOL/L (ref 3.5–5.2)
PROT SERPL-MCNC: 6.2 G/DL (ref 6–8.5)
RBC # BLD AUTO: 4.65 X10E6/UL (ref 4.14–5.8)
SODIUM SERPL-SCNC: 144 MMOL/L (ref 134–144)
T4 FREE SERPL-MCNC: 1.33 NG/DL (ref 0.82–1.77)
TRIGL SERPL-MCNC: 127 MG/DL (ref 0–149)
TSH SERPL DL<=0.005 MIU/L-ACNC: 5.82 UIU/ML (ref 0.45–4.5)
VLDLC SERPL CALC-MCNC: 23 MG/DL (ref 5–40)
WBC # BLD AUTO: 7.2 X10E3/UL (ref 3.4–10.8)

## 2022-08-29 ENCOUNTER — HOSPITAL ENCOUNTER (OUTPATIENT)
Dept: CARDIOLOGY | Facility: HOSPITAL | Age: 82
End: 2022-08-29

## 2022-09-26 ENCOUNTER — HOSPITAL ENCOUNTER (OUTPATIENT)
Dept: CARDIOLOGY | Facility: HOSPITAL | Age: 82
End: 2022-09-26

## 2023-05-10 RX ORDER — CLOPIDOGREL BISULFATE 75 MG/1
75 TABLET ORAL DAILY
Qty: 90 TABLET | Refills: 3 | Status: SHIPPED | OUTPATIENT
Start: 2023-05-10

## 2023-07-28 ENCOUNTER — TELEPHONE (OUTPATIENT)
Dept: CARDIOLOGY | Facility: CLINIC | Age: 83
End: 2023-07-28
Payer: MEDICARE

## 2023-07-28 RX ORDER — CLOPIDOGREL BISULFATE 75 MG/1
75 TABLET ORAL DAILY
Qty: 90 TABLET | Refills: 3 | Status: SHIPPED | OUTPATIENT
Start: 2023-07-28

## 2023-07-28 NOTE — TELEPHONE ENCOUNTER
Caller: IRENE GASCA    Relationship to patient: Emergency Contact    Best call back number: [phone]    Chief complaint: PT HAD TO RESCHEDULE HIS ONE YEAR FOLLOW UP. HUB SCHEDULED NEXT AVAILABLE 12.04.23. IT WAS RESCHEDULED FROM 07.18.23 OUTSIDE OF TIMEFRAME. PLEASE REACH OUT IF SOONER AVAILABLE.    Type of visit: FOLLOW UP    Requested date: ASAP    If rescheduling, when is the original appointment: 07.18.23

## 2023-07-28 NOTE — TELEPHONE ENCOUNTER
Caller: IRENE GASCA    Relationship: Emergency Contact    Best call back number: [phone]    Requested Prescriptions:   Requested Prescriptions     Pending Prescriptions Disp Refills    clopidogrel (PLAVIX) 75 MG tablet 90 tablet 3     Sig: Take 1 tablet by mouth Daily.        Pharmacy where request should be sent: [facility] - [place] [address], [place] - [phone]  - [phone] FX     Last office visit with prescribing clinician: 7/18/2022   Last telemedicine visit with prescribing clinician: Visit date not found   Next office visit with prescribing clinician: 12/4/2023         Does the patient have less than a 3 day supply:  [] Yes  [x] No    Would you like a call back once the refill request has been completed: [] Yes [x] No    If the office needs to give you a call back, can they leave a voicemail: [] Yes [x] No    Mary Fermin Rep   07/28/23 12:12 EDT

## 2023-08-21 ENCOUNTER — OFFICE VISIT (OUTPATIENT)
Dept: CARDIOLOGY | Facility: CLINIC | Age: 83
End: 2023-08-21
Payer: MEDICARE

## 2023-08-21 VITALS
BODY MASS INDEX: 27.46 KG/M2 | SYSTOLIC BLOOD PRESSURE: 120 MMHG | OXYGEN SATURATION: 96 % | DIASTOLIC BLOOD PRESSURE: 68 MMHG | WEIGHT: 214 LBS | HEART RATE: 60 BPM | HEIGHT: 74 IN | RESPIRATION RATE: 20 BRPM

## 2023-08-21 DIAGNOSIS — I10 ESSENTIAL HYPERTENSION: ICD-10-CM

## 2023-08-21 DIAGNOSIS — E78.2 MIXED HYPERLIPIDEMIA: ICD-10-CM

## 2023-08-21 DIAGNOSIS — I48.0 PAROXYSMAL ATRIAL FIBRILLATION: ICD-10-CM

## 2023-08-21 DIAGNOSIS — I25.118 CORONARY ARTERY DISEASE OF NATIVE ARTERY OF NATIVE HEART WITH STABLE ANGINA PECTORIS: Primary | ICD-10-CM

## 2023-08-21 PROCEDURE — 3078F DIAST BP <80 MM HG: CPT | Performed by: INTERNAL MEDICINE

## 2023-08-21 PROCEDURE — 99214 OFFICE O/P EST MOD 30 MIN: CPT | Performed by: INTERNAL MEDICINE

## 2023-08-21 PROCEDURE — 93000 ELECTROCARDIOGRAM COMPLETE: CPT | Performed by: INTERNAL MEDICINE

## 2023-08-21 PROCEDURE — 3074F SYST BP LT 130 MM HG: CPT | Performed by: INTERNAL MEDICINE

## 2023-08-21 NOTE — PROGRESS NOTES
[facility] AT [address], [address]  [place], [place], [address] [phone]  [facility]           OUTPATIENT CLINIC FOLLOW UP NOTE    Patient Care Team:  Patient Care Team:  Gee Rosen MD as PCP - General (Family Medicine)  Ej Livingston MD as Consulting Physician (Cardiology)    Subjective:      Chief Complaint   Patient presents with    Follow-up     Coronary artery disease of native artery of native heart with stable angina pectoris       HPI:    Fabian Chaparro is a 83 y.o. male.    Cardiac problem list:  CAD   3.5 x 12 mm BMS to the LAD in 3/2010  NSTEMI 2010 s/p LAD ISR s/p MOISES  NSTEMI 3/2012 and 10/2013 [facility] without new culprits for PCI   [facility] with patent stent and a proximal LAD lesion as well as a mid circumflex lesion that were not found to be significant by FFR.  [facility]  2019 atypical chest pain syndrome with a stress test negative for ischemia  Paroxysmal atrial fibrillation  CHADSVASc of 4, intolerant to anticoagulants due to bleeding (details of bleeding not known) including Xarelto, Pradaxa, Eliquis, Savaysa.  Reportedly, sister was to get a Watchman, but  of a stroke prior to work up.  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     Active with activities such as mowing, managing his bees and garden, without cardiopulmonary symptoms    Review of Systems:  As noted in HPI.      PFSH:  Patient Active Problem List   Diagnosis    Coronary artery disease of native artery of native heart with stable angina pectoris    Paroxysmal atrial fibrillation    Sick sinus syndrome     "Essential hypertension    Mixed hyperlipidemia    Chest pain    Precordial pain         Current Outpatient Medications:     amLODIPine (NORVASC) 10 MG tablet, Take 1 tablet by mouth Daily. (Patient taking differently: Take 0.5 tablets by mouth Daily.), Disp: 30 tablet, Rfl: 1    aspirin 81 MG EC tablet, Take 1 tablet by mouth Daily., Disp: , Rfl:     clopidogrel (PLAVIX) 75 MG tablet, Take 1 tablet by mouth Daily., Disp: 90 tablet, Rfl: 3    nitroglycerin (NITROSTAT) 0.4 MG SL tablet, 1 under the tongue as needed for angina, may repeat q5mins for up three doses, Disp: 100 tablet, Rfl: 11    O2 (OXYGEN), Inhale 2 L/min As Needed., Disp: , Rfl:     simvastatin (ZOCOR) 20 MG tablet, Take 1 tablet by mouth Every Night., Disp: , Rfl:     sotalol (BETAPACE) 120 MG tablet, Take 1 tablet by mouth Daily., Disp: 30 tablet, Rfl: 2    No Known Allergies     reports that he quit smoking about 23 years ago. His smoking use included cigarettes. He has a 15.00 pack-year smoking history. He has been exposed to tobacco smoke. He has never used smokeless tobacco.      Objective:   Physical exam:  /68 (BP Location: Right arm, Patient Position: Sitting, Cuff Size: Adult)   Pulse 60   Resp 20   Ht 188 cm (74.02\")   Wt 97.1 kg (214 lb)   SpO2 96%   BMI 27.46 kg/mý   CONSTITUTIONAL: No acute distress  RESPIRATORY: Normal effort. Clear to auscultation bilaterally without wheezing or rales  CARDIOVASCULAR:   Regular rate and rhythm with normal S1 and S2. Without murmur, gallop or rub. Normal radial pulse.      Labs:    Lab Results   Component Value Date    CHOL 146 06/28/2021    CHOL 139 11/27/2019     Lab Results   Component Value Date    TRIG 127 07/21/2022    TRIG 148 06/28/2021    TRIG 112 11/27/2019     Lab Results   Component Value Date    HDL 36 (L) 07/21/2022    HDL 37 (L) 06/28/2021    HDL 40 11/27/2019     Lab Results   Component Value Date    LDL 97 07/21/2022     No components found for: LDLDIRECTC    Diagnostic Data: "      ECG 12 Lead    Date/Time: 8/21/2023 9:55 AM  Performed by: Ej Livingston MD  Authorized by: Ej Livingston MD   Comparison: compared with previous ECG from 7/18/2022  Similar to previous ECG  Comparison to previous ECG: Atrial paced        DEVICE INTERROGATION:  [facility], Interrogation date 7/18/2022- RA pacing 67%, RV pacing <1%. P wave is 6.2 mV with a threshold of 0.8V @ 0.5 msec and an impedance of 804 ohms. R wave is 10.2 mV with a threshold of 1.2 V @ 0.5 msec and an impedance of 678 ohms.  Battery voltage is 3.5-4 years.  6% mode switched since 6/2021, with nonsustained V and atrial tach on 7/04/2022, no SVT since 4/23/2022.    DEVICE INTERROGATION:  [facility], Interrogation date 8/21/23- RA pacing 67%, RV pacing 2%. Threshold and impedances are acceptable. Battery voltage is 2.5 yrs.  5% AT/AF, brief 10-second runs of A-fib with RVR    Stress test 11/2019  Left ventricular ejection fraction is normal (Calculated EF = 62%).  Myocardial perfusion imaging indicates a normal myocardial perfusion study with no evidence of ischemia.  Impressions are consistent with a low risk study.    Results for orders placed during the hospital encounter of 07/18/22    Adult Transthoracic Echo Complete W/ Cont if Necessary Per Protocol    Interpretation Summary  ú Left ventricular ejection fraction appears to be 56 - 60%. Left ventricular systolic function is normal.  ú Left ventricular wall thickness is consistent with mild concentric hypertrophy.  ú Left ventricular diastolic function is consistent with (grade Ia w/high LAP) impaired relaxation.        Assessment and Plan:     Coronary artery disease  Mixed hyperlipidemia  CKD  -Currently without angina  -Did not have stress test last year due to diverticulitis and COVID.  Will defer for now since he is doing well from a cardiopulmonary standpoint  -Sublingual nitroglycerin 0.4 mg as needed for chest pain.   -Continue aspirin, clopidogrel, beta-blocker, calcium channel blocker,  statin  -Off Imdur without recurrent symptoms    Paroxysmal atrial fibrillation   Sick sinus syndrome   -Stable device interrogation, has remote checks set up    -Continue sotalol, renally dosed at once daily.    -Prior bleeding reportedly with 4 different anticoagulants at an outside facility.  The patient does not recall bleeding diatheses but knows that he did not tolerate the medicines well.  -No definitive prior EKG verifying atrial fibrillation but is having runs on DI that appear to be AFib     -Continue DAPT  -Previously discussed Watchman, patient wishes to hold off    Essential hypertension  -Continue current meds.    - Return in about 59 weeks (around 10/7/2024) for Next scheduled follow-up with [facility] device interrogation and an ECG.      Ej Livingston MD, MSc, FACC, [facility]  Interventional Cardiology  [facility]

## 2024-10-07 ENCOUNTER — OFFICE VISIT (OUTPATIENT)
Dept: CARDIOLOGY | Facility: CLINIC | Age: 84
End: 2024-10-07
Payer: MEDICARE

## 2024-10-07 VITALS
DIASTOLIC BLOOD PRESSURE: 60 MMHG | HEIGHT: 74 IN | HEART RATE: 131 BPM | OXYGEN SATURATION: 100 % | WEIGHT: 196.2 LBS | SYSTOLIC BLOOD PRESSURE: 132 MMHG | BODY MASS INDEX: 25.18 KG/M2

## 2024-10-07 DIAGNOSIS — I48.0 PAROXYSMAL ATRIAL FIBRILLATION: ICD-10-CM

## 2024-10-07 DIAGNOSIS — I48.0 PAROXYSMAL ATRIAL FIBRILLATION: Primary | ICD-10-CM

## 2024-10-07 DIAGNOSIS — I25.118 CORONARY ARTERY DISEASE OF NATIVE ARTERY OF NATIVE HEART WITH STABLE ANGINA PECTORIS: Primary | ICD-10-CM

## 2024-10-07 DIAGNOSIS — I10 ESSENTIAL HYPERTENSION: ICD-10-CM

## 2024-10-07 DIAGNOSIS — E78.2 MIXED HYPERLIPIDEMIA: ICD-10-CM

## 2024-10-07 PROCEDURE — 3078F DIAST BP <80 MM HG: CPT | Performed by: INTERNAL MEDICINE

## 2024-10-07 PROCEDURE — 93000 ELECTROCARDIOGRAM COMPLETE: CPT | Performed by: INTERNAL MEDICINE

## 2024-10-07 PROCEDURE — 3075F SYST BP GE 130 - 139MM HG: CPT | Performed by: INTERNAL MEDICINE

## 2024-10-07 PROCEDURE — 93280 PM DEVICE PROGR EVAL DUAL: CPT | Performed by: INTERNAL MEDICINE

## 2024-10-07 PROCEDURE — 99214 OFFICE O/P EST MOD 30 MIN: CPT | Performed by: INTERNAL MEDICINE

## 2024-10-07 RX ORDER — LEVALBUTEROL INHALATION SOLUTION 1.25 MG/3ML
1 SOLUTION RESPIRATORY (INHALATION) EVERY 6 HOURS PRN
COMMUNITY
Start: 2024-09-10

## 2024-10-07 RX ORDER — DILTIAZEM HYDROCHLORIDE 240 MG/1
240 CAPSULE, COATED, EXTENDED RELEASE ORAL DAILY
Qty: 90 CAPSULE | Refills: 3 | Status: SHIPPED | OUTPATIENT
Start: 2024-10-07

## 2024-10-07 RX ORDER — DILTIAZEM HYDROCHLORIDE 120 MG/1
120 CAPSULE, COATED, EXTENDED RELEASE ORAL DAILY
COMMUNITY
End: 2024-10-07 | Stop reason: SDUPTHER

## 2024-10-07 RX ORDER — ALBUTEROL SULFATE 90 UG/1
2 AEROSOL, METERED RESPIRATORY (INHALATION) AS NEEDED
COMMUNITY
Start: 2024-09-10

## 2024-10-07 NOTE — PROGRESS NOTES
[facility]   [address] [place] [address], [address]  [place], [place], [address] [phone]  [facility]           OUTPATIENT CLINIC FOLLOW UP NOTE    Patient Care Team:  Patient Care Team:  Gee Rosen MD as PCP - General (Family Medicine)  Ej Livingston MD as Consulting Physician (Cardiology)    Subjective:      Chief Complaint   Patient presents with    Coronary Artery Disease     Coronary artery disease of native artery of native heart with stable angina pectoris           HPI:    Fabian Chaparro is a 84 y.o. male.    Cardiac problem list:  CAD   3.5 x 12 mm BMS to the LAD in 3/2010  NSTEMI 2010 s/p LAD ISR s/p MOISES  NSTEMI 3/2012 and 10/2013 [facility] without new culprits for PCI   [facility] with patent stent and a proximal LAD lesion as well as a mid circumflex lesion that were not found to be significant by FFR.  [facility]  2019 atypical chest pain syndrome with a stress test negative for ischemia  TTE OSH 3/2024 EF 65%, no significant valve dysfunction  [facility] 2024. Stress test: small, mod sev, rev antsept and apical defect. Carotid mild and antegrade. TTE 55%, mild MR/PI  Paroxysmal atrial fibrillation  CHADSVASc of 4, intolerant to anticoagulants due to bleeding (details of bleeding not known) including Xarelto, Pradaxa, Eliquis, Savaysa.  Due to history of GI bleed  Reportedly, sister was to get a Watchman, but  of a stroke prior to work up.  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     Taken off of sotalol due to CKD.  Recurrent A-fib.  Increased burden.  Feels weak, tired, palpitations, occasional left-sided chest discomfort when he lays on his left side.    Review of Systems:  As noted in HPI.      PFSH:  Patient Active Problem List   Diagnosis    Coronary artery disease of native artery of native heart with stable angina pectoris    Paroxysmal atrial fibrillation    Sick sinus syndrome    Essential hypertension    Mixed hyperlipidemia    Chest pain    Precordial pain         Current Outpatient Medications:     aspirin 81 MG EC tablet, Take 1 tablet by mouth Daily., Disp: , Rfl:     clopidogrel (PLAVIX) 75 MG tablet, Take 1 tablet by mouth Daily., Disp: 90 tablet, Rfl: 3    dilTIAZem CD (CARDIZEM CD) 240 MG 24 hr capsule, Take 1 capsule by mouth Daily., Disp: 90 capsule, Rfl: 3    ipratropium (ATROVENT) 0.02 % nebulizer solution, Take 2.5 mL by nebulization As Needed for Wheezing or Shortness of Air., Disp: , Rfl:     levalbuterol (XOPENEX) 1.25 MG/3ML nebulizer solution, Take 1 ampule by nebulization Every 6 (Six) Hours As Needed., Disp: , Rfl:     nitroglycerin (NITROSTAT) 0.4 MG SL tablet, 1 under the tongue as needed for angina, may repeat q5mins for up three doses, Disp: 100 tablet, Rfl: 11    O2 (OXYGEN), Inhale 2 L/min As Needed., Disp: , Rfl:     simvastatin (ZOCOR) 20 MG tablet, Take 1 tablet by mouth Every Night., Disp: , Rfl:     Ventolin  (90 Base) MCG/ACT inhaler, Inhale 2 puffs As Needed., Disp: , Rfl:     No Known Allergies     reports that he quit smoking about 24 years ago. His smoking use included cigarettes. He started smoking about 54 years ago. He has a 15 pack-year smoking history. He has been exposed to tobacco smoke. He has never used smokeless tobacco.      Objective:   Physical exam:  /60 (BP Location: Right arm, Patient Position: Sitting, Cuff Size: Adult)   Pulse (!) 131   Ht 188 cm (74\")   Wt " "89 kg (196 lb 3.2 oz)   SpO2 100%   BMI 25.19 kg/m²   CONSTITUTIONAL: No acute distress  CARDIOVASCULAR:   Irregularly irregular rate and rhythm    Labs:    Lab Results   Component Value Date    CHOL 146 06/28/2021    CHOL 139 11/27/2019     Lab Results   Component Value Date    TRIG 127 07/21/2022    TRIG 148 06/28/2021    TRIG 112 11/27/2019     Lab Results   Component Value Date    HDL 36 (L) 07/21/2022    HDL 37 (L) 06/28/2021    HDL 40 11/27/2019     Lab Results   Component Value Date    LDL 97 07/21/2022     No components found for: \"LDLDIRECTC\"    Diagnostic Data:      ECG 12 Lead    Date/Time: 10/7/2024 11:10 AM  Performed by: Ej Livingston MD    Authorized by: Ej Livingston MD  Comparison: compared with previous ECG from 8/21/2023  Comparison to previous ECG: Now in A-fib with RVR  Rhythm: atrial fibrillation          Results for orders placed during the hospital encounter of 07/18/22    Adult Transthoracic Echo Complete W/ Cont if Necessary Per Protocol    Interpretation Summary  · Left ventricular ejection fraction appears to be 56 - 60%. Left ventricular systolic function is normal.  · Left ventricular wall thickness is consistent with mild concentric hypertrophy.  · Left ventricular diastolic function is consistent with (grade Ia w/high LAP) impaired relaxation.      DEVICE INTERROGATION:  [facility], Interrogation date 8/21/23- RA pacing 67%, RV pacing 2%. Threshold and impedances are acceptable. Battery voltage is 2.5 yrs.  5% AT/AF, brief 10-second runs of A-fib with RVR    DEVICE INTERROGATION:  [facility], Interrogation date 10/07/24- RA pacing 15%, RV pacing 2%. Threshold and impedances are acceptable. Battery voltage is 7 mos. 56% AFib.      Assessment and Plan:     Coronary artery disease  Mixed hyperlipidemia  CKD  -Intermittent chest pains while dealing with A-fib with RVR  -Will hold off repeat stress testing since most recent one was only in 1/2024 at OSH.  Acceptable at that time.  If with unstable " symptoms, will consider heart cath versus repeat stress testing  -Continue aspirin, clopidogrel, beta-blocker, calcium channel blocker, statin  -Off Imdur without recurrent symptoms  -Sublingual nitroglycerin 0.4 mg as needed for chest pain.     Paroxysmal atrial fibrillation   Sick sinus syndrome   -Recurrent A-fib with RVR off of sotalol due to renal insufficiency (3/2024 CrCl now less than 40 ml/min at 38 ml/min)  -With frequent and current A-fib with RVR, symptomatic  -Increasing diltiazem  -Repeat EKG at PCP office later this week.  If still with difficulty with rate control, will consider adding amiodarone    -In the interim, working on establishing care with EP for possible Watchman.  Patient historically reluctant but now open to move forward  -Discussed with Dr. Dumont; will see if we can get him into the EP clinic as early as tomorrow if he can make the trip.  -Dr. Dumont's next watchman implant days 10/14, but it is uncertain whether or not we can make this arrangement  -Intolerant to multiple anticoagulants due to GI bleeding  -Continue DAPT, which may also be the post implant plan (will defer to EP)    Essential hypertension  -Continue current meds.    - Return in about 1 year (around 10/7/2025) for Next follow-up with an ECG.      Ej Livingston MD, MSc, FACC, [facility]  Interventional Cardiology  [facility]